# Patient Record
Sex: FEMALE | Race: WHITE | NOT HISPANIC OR LATINO | Employment: UNEMPLOYED | URBAN - METROPOLITAN AREA
[De-identification: names, ages, dates, MRNs, and addresses within clinical notes are randomized per-mention and may not be internally consistent; named-entity substitution may affect disease eponyms.]

---

## 2017-01-04 ENCOUNTER — ALLSCRIPTS OFFICE VISIT (OUTPATIENT)
Dept: PERINATAL CARE | Facility: CLINIC | Age: 37
End: 2017-01-04
Payer: COMMERCIAL

## 2017-01-04 ENCOUNTER — GENERIC CONVERSION - ENCOUNTER (OUTPATIENT)
Dept: OTHER | Facility: OTHER | Age: 37
End: 2017-01-04

## 2017-01-04 PROCEDURE — 76817 TRANSVAGINAL US OBSTETRIC: CPT | Performed by: OBSTETRICS & GYNECOLOGY

## 2017-01-04 PROCEDURE — 76811 OB US DETAILED SNGL FETUS: CPT | Performed by: OBSTETRICS & GYNECOLOGY

## 2017-02-01 ENCOUNTER — GENERIC CONVERSION - ENCOUNTER (OUTPATIENT)
Dept: OTHER | Facility: OTHER | Age: 37
End: 2017-02-01

## 2017-02-01 ENCOUNTER — ALLSCRIPTS OFFICE VISIT (OUTPATIENT)
Dept: OTHER | Facility: OTHER | Age: 37
End: 2017-02-01

## 2017-02-01 DIAGNOSIS — Z34.90 ENCOUNTER FOR SUPERVISION OF NORMAL PREGNANCY: ICD-10-CM

## 2017-02-17 ENCOUNTER — TRANSCRIBE ORDERS (OUTPATIENT)
Dept: LAB | Facility: HOSPITAL | Age: 37
End: 2017-02-17

## 2017-02-17 ENCOUNTER — APPOINTMENT (OUTPATIENT)
Dept: LAB | Facility: HOSPITAL | Age: 37
End: 2017-02-17
Attending: OBSTETRICS & GYNECOLOGY
Payer: COMMERCIAL

## 2017-02-17 DIAGNOSIS — Z34.90 ENCOUNTER FOR SUPERVISION OF NORMAL PREGNANCY: ICD-10-CM

## 2017-02-17 LAB
BASOPHILS # BLD AUTO: 0.01 THOUSANDS/ΜL (ref 0–0.1)
BASOPHILS NFR BLD AUTO: 0 % (ref 0–1)
EOSINOPHIL # BLD AUTO: 0.16 THOUSAND/ΜL (ref 0–0.61)
EOSINOPHIL NFR BLD AUTO: 1 % (ref 0–6)
ERYTHROCYTE [DISTWIDTH] IN BLOOD BY AUTOMATED COUNT: 13.1 % (ref 11.6–15.1)
GLUCOSE 1H P 50 G GLC PO SERPL-MCNC: 129 MG/DL
HCT VFR BLD AUTO: 34.8 % (ref 34.8–46.1)
HGB BLD-MCNC: 11.8 G/DL (ref 11.5–15.4)
LYMPHOCYTES # BLD AUTO: 1.8 THOUSANDS/ΜL (ref 0.6–4.47)
LYMPHOCYTES NFR BLD AUTO: 14 % (ref 14–44)
MCH RBC QN AUTO: 29.8 PG (ref 26.8–34.3)
MCHC RBC AUTO-ENTMCNC: 33.9 G/DL (ref 31.4–37.4)
MCV RBC AUTO: 88 FL (ref 82–98)
MONOCYTES # BLD AUTO: 0.69 THOUSAND/ΜL (ref 0.17–1.22)
MONOCYTES NFR BLD AUTO: 5 % (ref 4–12)
NEUTROPHILS # BLD AUTO: 10.48 THOUSANDS/ΜL (ref 1.85–7.62)
NEUTS SEG NFR BLD AUTO: 80 % (ref 43–75)
NRBC BLD AUTO-RTO: 0 /100 WBCS
PLATELET # BLD AUTO: 239 THOUSANDS/UL (ref 149–390)
PMV BLD AUTO: 9.6 FL (ref 8.9–12.7)
RBC # BLD AUTO: 3.96 MILLION/UL (ref 3.81–5.12)
RPR SER QL: NORMAL
WBC # BLD AUTO: 13.24 THOUSAND/UL (ref 4.31–10.16)

## 2017-02-17 PROCEDURE — 85025 COMPLETE CBC W/AUTO DIFF WBC: CPT

## 2017-02-17 PROCEDURE — 82950 GLUCOSE TEST: CPT

## 2017-02-17 PROCEDURE — 86592 SYPHILIS TEST NON-TREP QUAL: CPT

## 2017-02-17 PROCEDURE — 36415 COLL VENOUS BLD VENIPUNCTURE: CPT

## 2017-03-02 ENCOUNTER — GENERIC CONVERSION - ENCOUNTER (OUTPATIENT)
Dept: OTHER | Facility: OTHER | Age: 37
End: 2017-03-02

## 2017-03-16 ENCOUNTER — GENERIC CONVERSION - ENCOUNTER (OUTPATIENT)
Dept: OTHER | Facility: OTHER | Age: 37
End: 2017-03-16

## 2017-03-30 ENCOUNTER — GENERIC CONVERSION - ENCOUNTER (OUTPATIENT)
Dept: OTHER | Facility: OTHER | Age: 37
End: 2017-03-30

## 2017-04-11 ENCOUNTER — ALLSCRIPTS OFFICE VISIT (OUTPATIENT)
Dept: PERINATAL CARE | Facility: CLINIC | Age: 37
End: 2017-04-11
Payer: COMMERCIAL

## 2017-04-11 ENCOUNTER — GENERIC CONVERSION - ENCOUNTER (OUTPATIENT)
Dept: OTHER | Facility: OTHER | Age: 37
End: 2017-04-11

## 2017-04-11 PROCEDURE — 76816 OB US FOLLOW-UP PER FETUS: CPT | Performed by: OBSTETRICS & GYNECOLOGY

## 2017-04-13 ENCOUNTER — GENERIC CONVERSION - ENCOUNTER (OUTPATIENT)
Dept: OTHER | Facility: OTHER | Age: 37
End: 2017-04-13

## 2017-04-27 ENCOUNTER — ALLSCRIPTS OFFICE VISIT (OUTPATIENT)
Dept: OTHER | Facility: OTHER | Age: 37
End: 2017-04-27

## 2017-04-29 LAB — STREP GRP B, MOLECULAR (HISTORICAL): NEGATIVE

## 2017-04-30 ENCOUNTER — APPOINTMENT (EMERGENCY)
Dept: RADIOLOGY | Facility: HOSPITAL | Age: 37
End: 2017-04-30
Payer: COMMERCIAL

## 2017-04-30 ENCOUNTER — HOSPITAL ENCOUNTER (OUTPATIENT)
Facility: HOSPITAL | Age: 37
Discharge: HOME | End: 2017-04-30
Attending: OBSTETRICS & GYNECOLOGY | Admitting: OBSTETRICS & GYNECOLOGY
Payer: COMMERCIAL

## 2017-04-30 ENCOUNTER — HOSPITAL ENCOUNTER (EMERGENCY)
Facility: HOSPITAL | Age: 37
Discharge: HOME | End: 2017-04-30
Attending: EMERGENCY MEDICINE | Admitting: EMERGENCY MEDICINE
Payer: COMMERCIAL

## 2017-04-30 VITALS
TEMPERATURE: 98.6 F | SYSTOLIC BLOOD PRESSURE: 110 MMHG | RESPIRATION RATE: 20 BRPM | WEIGHT: 190 LBS | OXYGEN SATURATION: 97 % | HEART RATE: 86 BPM | DIASTOLIC BLOOD PRESSURE: 66 MMHG

## 2017-04-30 VITALS
RESPIRATION RATE: 20 BRPM | HEART RATE: 96 BPM | DIASTOLIC BLOOD PRESSURE: 81 MMHG | TEMPERATURE: 98.5 F | WEIGHT: 192 LBS | BODY MASS INDEX: 34.02 KG/M2 | SYSTOLIC BLOOD PRESSURE: 119 MMHG | HEIGHT: 63 IN

## 2017-04-30 DIAGNOSIS — R05.9 COUGH: Primary | ICD-10-CM

## 2017-04-30 PROCEDURE — 99283 EMERGENCY DEPT VISIT LOW MDM: CPT

## 2017-04-30 PROCEDURE — 99214 OFFICE O/P EST MOD 30 MIN: CPT

## 2017-04-30 PROCEDURE — 71020 HB CHEST X-RAY 2VW FRONTAL&LATL: CPT

## 2017-04-30 NOTE — PROGRESS NOTES
L&D Triage Note - OB/GYN  Nira Skiff 30 y o  female MRN: [id number]  Unit/Bed#:  Triage 2 Encounter: [id number]      SUBJECTIVE:  Nira Skiff 30 y o  [id number] at 37w0d complaining of rib pain over the past 2 days  Has a hx of seasonal allergies likely compounded with viral URI  Denies CP, SOB, dyspnea on exertion, palpitations, LOC, dizziness, or fatigue  Rib pain is sharp, B/L at lateral chest and superficial - parasternal, 7/10, and exacerbation by sudden deep inspiration and cough  Reproducible with palpation  Her current obstetrical history is unremarkable  Contractions: None  Leakage of fluid: None  Vaginal Bleeding: None  Fetal movement: present  OBJECTIVE:  Vitals:    17 1537   BP: 119/81   Pulse: 96   Resp: 20   Temp: 98 5 °F (36 9 °C)         FHT:  135 bpm baseline / Moderate 6 - 25 bpm / reactive, reassuring tracing  [place]: none      ASSESSMENT:  Nira Skiff 30 y o   at 37w0d with seasonal allergies and likely compound viral URI complaining of sharp pain with cough - costochondritis  PLAN:  1  Continue routine prenatal care, recommended tylenol, Delsom, Robitussin DM, claritin, or benadryl  2  Discharge from [facility] triage with labor precautions  3  Case discussed with Dr KELLEY Taylor MD  OB/GYN PGY-1  2017 4:09 PM]

## 2017-04-30 NOTE — IP AVS SNAPSHOT
[address] [facility]  [address] [facility], [address]  [phone]                  After Visit Summary for:             Mika Agarwal   37 yrs Female (1980)    MRN:  [id number]           About your hospitalization     You were discharged on:  April 30, 2017 You last received care in the:  [address] [place] [address] and Delivery     Unit phone number:  [phone]         Medications     It is important that you maintain an up-to-date list of medications (prescribed and over-the-counter, as well as vitamins and mineral supplements) that you are taking  Bring your list of current medications whenever you seek treatment at a hospital or other healthcare setting  If you have any questions or concerns, contact your primary care physician's office  Your Medications      TAKE these medications     PRENATAL VITAMIN PO   Take 1 tablet by mouth daily   Refills:  0                      Your Medications      Your Medication List           Morning Noon Evening Bedtime As Needed    PRENATAL VITAMIN PO   Take 1 tablet by mouth daily                                            Follow-ups for After Discharge        Immunizations     Name Date      DTaP 03/02/17     Influenza, Quadrivalent 11/10/16       Your Allergies  Date Reviewed: 4/30/2017    Allergen Reactions    Clarithromycin GI Intolerance    Biaxin          Instructions for After Discharge           Summary of Your Hospitalization        Reason for Hospitalization     Your primary diagnosis was:  Not on File      Chief Complaint     Rib Pain      Attending/Consulting Providers     Provider Service Role Specialty Primary office phone    Carrie Johnson MD -- Attending Provider Obstetrics and Gynecology [phone]         [facility]     To access this document and other health information online, please visit www  [employer] to login or create a patient portal account     For questions about any pending test results, you may contact the ordering physician or your primary care provider  Educational Information     Notify Physician for any of the following:    · Regular contractions - More than 6 per hour every 5 minutes    · Leakage of fluid (water breaks)  Observe time and color of fluid  · Vaginal bleeding  · Elevated temperature greater than 100 4°F     · Symptoms of frequent urination, blood in the urine and/or burning upon urination  · Decrease in fetal movement in last 8 hours, Fetal kick count less than 10 movements in 2 hours    · Any other questions and/or problems

## 2017-05-04 ENCOUNTER — GENERIC CONVERSION - ENCOUNTER (OUTPATIENT)
Dept: OTHER | Facility: OTHER | Age: 37
End: 2017-05-04

## 2017-05-08 ENCOUNTER — GENERIC CONVERSION - ENCOUNTER (OUTPATIENT)
Dept: OTHER | Facility: OTHER | Age: 37
End: 2017-05-08

## 2017-05-11 ENCOUNTER — GENERIC CONVERSION - ENCOUNTER (OUTPATIENT)
Dept: OTHER | Facility: OTHER | Age: 37
End: 2017-05-11

## 2017-05-17 ENCOUNTER — GENERIC CONVERSION - ENCOUNTER (OUTPATIENT)
Dept: OTHER | Facility: OTHER | Age: 37
End: 2017-05-17

## 2017-05-17 ENCOUNTER — ALLSCRIPTS OFFICE VISIT (OUTPATIENT)
Dept: OTHER | Facility: OTHER | Age: 37
End: 2017-05-17

## 2017-05-22 ENCOUNTER — GENERIC CONVERSION - ENCOUNTER (OUTPATIENT)
Dept: OTHER | Facility: OTHER | Age: 37
End: 2017-05-22

## 2017-05-22 LAB — EXTERNAL GROUP B STREP ANTIGEN: NEGATIVE

## 2017-05-24 ENCOUNTER — ANESTHESIA EVENT (INPATIENT)
Dept: LABOR AND DELIVERY | Facility: HOSPITAL | Age: 37
End: 2017-05-24
Payer: COMMERCIAL

## 2017-05-24 ENCOUNTER — ANESTHESIA (INPATIENT)
Dept: LABOR AND DELIVERY | Facility: HOSPITAL | Age: 37
End: 2017-05-24
Payer: COMMERCIAL

## 2017-05-24 ENCOUNTER — HOSPITAL ENCOUNTER (INPATIENT)
Facility: HOSPITAL | Age: 37
LOS: 2 days | Discharge: HOME | End: 2017-05-26
Attending: OBSTETRICS & GYNECOLOGY | Admitting: OBSTETRICS & GYNECOLOGY
Payer: COMMERCIAL

## 2017-05-24 DIAGNOSIS — O47.9 UTERINE CONTRACTIONS DURING PREGNANCY: ICD-10-CM

## 2017-05-24 DIAGNOSIS — Z3A.40 40 WEEKS GESTATION OF PREGNANCY: ICD-10-CM

## 2017-05-24 LAB
ABO GROUP BLD: NORMAL
BASE EXCESS BLDCOA CALC-SCNC: -4.6 MMOL/L (ref 3–11)
BASE EXCESS BLDCOV CALC-SCNC: -4.7 MMOL/L (ref 1–9)
BASOPHILS # BLD AUTO: 0.02 THOUSANDS/ΜL (ref 0–0.1)
BASOPHILS NFR BLD AUTO: 0 % (ref 0–1)
BLD GP AB SCN SERPL QL: NEGATIVE
EOSINOPHIL # BLD AUTO: 0.36 THOUSAND/ΜL (ref 0–0.61)
EOSINOPHIL NFR BLD AUTO: 2 % (ref 0–6)
ERYTHROCYTE [DISTWIDTH] IN BLOOD BY AUTOMATED COUNT: 14.6 % (ref 11.6–15.1)
HCO3 BLDCOA-SCNC: 24.6 MMOL/L (ref 17.3–27.3)
HCO3 BLDCOV-SCNC: 23 MMOL/L (ref 12.2–28.6)
HCT VFR BLD AUTO: 35.2 % (ref 34.8–46.1)
HGB BLD-MCNC: 11.4 G/DL (ref 11.5–15.4)
LYMPHOCYTES # BLD AUTO: 2.32 THOUSANDS/ΜL (ref 0.6–4.47)
LYMPHOCYTES NFR BLD AUTO: 15 % (ref 14–44)
MCH RBC QN AUTO: 27.3 PG (ref 26.8–34.3)
MCHC RBC AUTO-ENTMCNC: 32.4 G/DL (ref 31.4–37.4)
MCV RBC AUTO: 84 FL (ref 82–98)
MONOCYTES # BLD AUTO: 1.1 THOUSAND/ΜL (ref 0.17–1.22)
MONOCYTES NFR BLD AUTO: 7 % (ref 4–12)
NEUTROPHILS # BLD AUTO: 11.93 THOUSANDS/ΜL (ref 1.85–7.62)
NEUTS SEG NFR BLD AUTO: 76 % (ref 43–75)
NRBC BLD AUTO-RTO: 0 /100 WBCS
O2 CT VFR BLDCOA CALC: 4.2 ML/DL
OXYHGB MFR BLDCOA: 16.5 %
OXYHGB MFR BLDCOV: 33.3 %
PCO2 BLDCOA: 61.3 MM[HG] (ref 30–60)
PCO2 BLDCOV: 51.7 MM HG (ref 27–43)
PH BLDCOA: 7.22 [PH] (ref 7.23–7.43)
PH BLDCOV: 7.27 [PH] (ref 7.19–7.49)
PLATELET # BLD AUTO: 245 THOUSANDS/UL (ref 149–390)
PMV BLD AUTO: 10.1 FL (ref 8.9–12.7)
PO2 BLDCOA: 12.5 MM HG (ref 5–25)
PO2 BLDCOV: 18.8 MM HG (ref 15–45)
RBC # BLD AUTO: 4.17 MILLION/UL (ref 3.81–5.12)
RH BLD: NEGATIVE
SAO2 % BLDCOV: 8.5 ML/DL
SPECIMEN EXPIRATION DATE: NORMAL
WBC # BLD AUTO: 15.83 THOUSAND/UL (ref 4.31–10.16)

## 2017-05-24 PROCEDURE — 85025 COMPLETE CBC W/AUTO DIFF WBC: CPT | Performed by: OBSTETRICS & GYNECOLOGY

## 2017-05-24 PROCEDURE — A9270 NON-COVERED ITEM OR SERVICE: HCPCS | Performed by: OBSTETRICS & GYNECOLOGY

## 2017-05-24 PROCEDURE — 82805 BLOOD GASES W/O2 SATURATION: CPT | Performed by: OBSTETRICS & GYNECOLOGY

## 2017-05-24 PROCEDURE — G0463 HOSPITAL OUTPT CLINIC VISIT: HCPCS

## 2017-05-24 PROCEDURE — 86592 SYPHILIS TEST NON-TREP QUAL: CPT | Performed by: OBSTETRICS & GYNECOLOGY

## 2017-05-24 PROCEDURE — 86850 RBC ANTIBODY SCREEN: CPT | Performed by: OBSTETRICS & GYNECOLOGY

## 2017-05-24 PROCEDURE — 88307 TISSUE EXAM BY PATHOLOGIST: CPT | Performed by: OBSTETRICS & GYNECOLOGY

## 2017-05-24 PROCEDURE — 86900 BLOOD TYPING SEROLOGIC ABO: CPT | Performed by: OBSTETRICS & GYNECOLOGY

## 2017-05-24 PROCEDURE — 99214 OFFICE O/P EST MOD 30 MIN: CPT

## 2017-05-24 PROCEDURE — 86901 BLOOD TYPING SEROLOGIC RH(D): CPT | Performed by: OBSTETRICS & GYNECOLOGY

## 2017-05-24 PROCEDURE — 0KQM0ZZ REPAIR PERINEUM MUSCLE, OPEN APPROACH: ICD-10-PCS | Performed by: OBSTETRICS & GYNECOLOGY

## 2017-05-24 RX ORDER — SODIUM CHLORIDE, SODIUM LACTATE, POTASSIUM CHLORIDE, CALCIUM CHLORIDE 600; 310; 30; 20 MG/100ML; MG/100ML; MG/100ML; MG/100ML
125 INJECTION, SOLUTION INTRAVENOUS CONTINUOUS
Status: DISCONTINUED | OUTPATIENT
Start: 2017-05-24 | End: 2017-05-26 | Stop reason: HOSPADM

## 2017-05-24 RX ORDER — DOCUSATE SODIUM 100 MG/1
100 CAPSULE, LIQUID FILLED ORAL 2 TIMES DAILY PRN
Status: DISCONTINUED | OUTPATIENT
Start: 2017-05-24 | End: 2017-05-26 | Stop reason: HOSPADM

## 2017-05-24 RX ORDER — OXYTOCIN/RINGER'S LACTATE 30/500 ML
PLASTIC BAG, INJECTION (ML) INTRAVENOUS
Status: COMPLETED
Start: 2017-05-24 | End: 2017-05-24

## 2017-05-24 RX ORDER — OXYTOCIN/RINGER'S LACTATE 30/500 ML
250 PLASTIC BAG, INJECTION (ML) INTRAVENOUS
Status: DISCONTINUED | OUTPATIENT
Start: 2017-05-24 | End: 2017-05-26 | Stop reason: HOSPADM

## 2017-05-24 RX ORDER — ACETAMINOPHEN 325 MG/1
650 TABLET ORAL EVERY 6 HOURS PRN
Status: DISCONTINUED | OUTPATIENT
Start: 2017-05-24 | End: 2017-05-26 | Stop reason: HOSPADM

## 2017-05-24 RX ORDER — IBUPROFEN 600 MG/1
600 TABLET ORAL EVERY 6 HOURS PRN
Status: DISCONTINUED | OUTPATIENT
Start: 2017-05-24 | End: 2017-05-26 | Stop reason: HOSPADM

## 2017-05-24 RX ORDER — ONDANSETRON 2 MG/ML
4 INJECTION INTRAMUSCULAR; INTRAVENOUS EVERY 6 HOURS PRN
Status: DISCONTINUED | OUTPATIENT
Start: 2017-05-24 | End: 2017-05-25

## 2017-05-24 RX ORDER — DIAPER,BRIEF,INFANT-TODD,DISP
1 EACH MISCELLANEOUS AS NEEDED
Status: DISCONTINUED | OUTPATIENT
Start: 2017-05-24 | End: 2017-05-26 | Stop reason: HOSPADM

## 2017-05-24 RX ADMIN — WITCH HAZEL 1 PAD: 500 SOLUTION RECTAL; TOPICAL at 09:27

## 2017-05-24 RX ADMIN — SODIUM CHLORIDE, SODIUM LACTATE, POTASSIUM CHLORIDE, AND CALCIUM CHLORIDE 125 ML/HR: .6; .31; .03; .02 INJECTION, SOLUTION INTRAVENOUS at 06:36

## 2017-05-24 RX ADMIN — SODIUM CHLORIDE, SODIUM LACTATE, POTASSIUM CHLORIDE, AND CALCIUM CHLORIDE 999 ML/HR: .6; .31; .03; .02 INJECTION, SOLUTION INTRAVENOUS at 05:30

## 2017-05-24 RX ADMIN — BENZOCAINE AND MENTHOL: 20; .5 SPRAY TOPICAL at 09:26

## 2017-05-24 RX ADMIN — HYDROCORTISONE 1 APPLICATION: 10 CREAM TOPICAL at 09:26

## 2017-05-24 RX ADMIN — DOCUSATE SODIUM 100 MG: 100 CAPSULE, LIQUID FILLED ORAL at 09:27

## 2017-05-24 RX ADMIN — Medication 250 UNITS: at 07:22

## 2017-05-24 RX ADMIN — Medication 250 ML: at 06:00

## 2017-05-25 LAB
ABO GROUP BLD: NORMAL
BLD GP AB SCN SERPL QL: NEGATIVE
FETAL CELL SCN BLD QL ROSETTE: NEGATIVE
RH BLD: NEGATIVE
RPR SER QL: NORMAL

## 2017-05-25 PROCEDURE — 85461 HEMOGLOBIN FETAL: CPT | Performed by: OBSTETRICS & GYNECOLOGY

## 2017-05-25 PROCEDURE — 86900 BLOOD TYPING SEROLOGIC ABO: CPT | Performed by: OBSTETRICS & GYNECOLOGY

## 2017-05-25 PROCEDURE — 86850 RBC ANTIBODY SCREEN: CPT | Performed by: OBSTETRICS & GYNECOLOGY

## 2017-05-25 PROCEDURE — 86901 BLOOD TYPING SEROLOGIC RH(D): CPT | Performed by: OBSTETRICS & GYNECOLOGY

## 2017-05-25 PROCEDURE — A9270 NON-COVERED ITEM OR SERVICE: HCPCS | Performed by: OBSTETRICS & GYNECOLOGY

## 2017-05-25 RX ADMIN — Medication 1 TABLET: at 09:33

## 2017-05-25 RX ADMIN — HUMAN RHO(D) IMMUNE GLOBULIN 300 MCG: 300 INJECTION, SOLUTION INTRAMUSCULAR at 15:40

## 2017-05-25 RX ADMIN — DOCUSATE SODIUM 100 MG: 100 CAPSULE, LIQUID FILLED ORAL at 09:33

## 2017-05-25 RX ADMIN — DOCUSATE SODIUM 100 MG: 100 CAPSULE, LIQUID FILLED ORAL at 18:49

## 2017-05-26 VITALS
WEIGHT: 194 LBS | HEIGHT: 63 IN | TEMPERATURE: 98.1 F | BODY MASS INDEX: 34.38 KG/M2 | HEART RATE: 81 BPM | SYSTOLIC BLOOD PRESSURE: 118 MMHG | DIASTOLIC BLOOD PRESSURE: 76 MMHG | RESPIRATION RATE: 20 BRPM | OXYGEN SATURATION: 99 %

## 2017-05-26 PROCEDURE — A9270 NON-COVERED ITEM OR SERVICE: HCPCS | Performed by: OBSTETRICS & GYNECOLOGY

## 2017-05-26 RX ORDER — DOCUSATE SODIUM 100 MG/1
100 CAPSULE, LIQUID FILLED ORAL 2 TIMES DAILY PRN
Qty: 10 CAPSULE | Refills: 0
Start: 2017-05-26 | End: 2018-06-14 | Stop reason: ALTCHOICE

## 2017-05-26 RX ORDER — IBUPROFEN 600 MG/1
600 TABLET ORAL EVERY 6 HOURS PRN
Qty: 30 TABLET | Refills: 0
Start: 2017-05-26 | End: 2018-06-14 | Stop reason: ALTCHOICE

## 2017-05-26 RX ADMIN — DOCUSATE SODIUM 100 MG: 100 CAPSULE, LIQUID FILLED ORAL at 07:37

## 2017-06-03 ENCOUNTER — TELEPHONE (OUTPATIENT)
Dept: LABOR AND DELIVERY | Facility: HOSPITAL | Age: 37
End: 2017-06-03

## 2017-06-05 ENCOUNTER — TELEPHONE (OUTPATIENT)
Dept: LABOR AND DELIVERY | Facility: HOSPITAL | Age: 37
End: 2017-06-05

## 2017-07-06 ENCOUNTER — GENERIC CONVERSION - ENCOUNTER (OUTPATIENT)
Dept: OTHER | Facility: OTHER | Age: 37
End: 2017-07-06

## 2017-07-24 ENCOUNTER — GENERIC CONVERSION - ENCOUNTER (OUTPATIENT)
Dept: OTHER | Facility: OTHER | Age: 37
End: 2017-07-24

## 2017-07-26 ENCOUNTER — HOSPITAL ENCOUNTER (OUTPATIENT)
Dept: RADIOLOGY | Facility: HOSPITAL | Age: 37
Discharge: HOME | End: 2017-07-26
Attending: OBSTETRICS & GYNECOLOGY
Payer: COMMERCIAL

## 2017-07-26 PROCEDURE — 76830 TRANSVAGINAL US NON-OB: CPT

## 2017-07-26 PROCEDURE — 76856 US EXAM PELVIC COMPLETE: CPT

## 2017-08-08 ENCOUNTER — GENERIC CONVERSION - ENCOUNTER (OUTPATIENT)
Dept: OTHER | Facility: OTHER | Age: 37
End: 2017-08-08

## 2017-08-24 ENCOUNTER — GENERIC CONVERSION - ENCOUNTER (OUTPATIENT)
Dept: OTHER | Facility: OTHER | Age: 37
End: 2017-08-24

## 2018-01-09 NOTE — MISCELLANEOUS
Message   Recorded as Task   Date: 10/05/2016 12:15 PM, Created By: Romi Weeks   Task Name: Call Back   Assigned To: Sar[facility]ccoy OB,Team   Regarding Patient: Bob Ventura, Status: Active   Comment:    Yeni Brandt - 05 Oct 2016 12:15 PM     TASK CREATED  pt is going on a cruise in 2 wks and wants to know if there is anything that she can take for sea sickness  [phone]   Jazzmine Benavides - 05 Oct 2016 12:51 PM     TASK REPLIED TO: Previously Assigned To [facility],Team    spoke w/ pt will d/w pt @ obv on 10/6/16        Active Problems    1  Acute sinusitis (461 9) (J01 90)   2  Allergic rhinitis (477 9) (J30 9)   3  Encounter for fetal anatomic survey (V28 81) (Z36)   4  Encounter for gynecological examination without abnormal finding (V72 31) (Z01 419)   5  Encounter for Rh blood typing (V72 86) (Z01 83)   6  Need for vaccination for DTP (V06 1) (Z23)   7  Oral contraceptive prescribed (V25 01) (Z30 011)   8  Postpartum depression (616 94,961) (F53)   9  Pregnancy (V22 2) (Z33 1)   10  Screening, , for risk of pre-term labor (V28 82) (Z36)   11  Umbilical hernia (031 5) (K42 9)   12  Vaginitis due to Candida albicans (112 1) (B37 3)    Current Meds   1  No Reported Medications Recorded    Allergies    1  Biaxin TABS    2  Animal dander - Dogs   3  Grass   4  Pollen   5   Ragweed    Signatures   Electronically signed by : Elsy Rinaldi, ; Oct  5 2016 12:52PM EST                       (Author)

## 2018-01-11 NOTE — MISCELLANEOUS
Message   Recorded as Task   Date: 08/22/2017 05:56 PM, Created By: Zenia Handley   Task Name: Care Coordination   Assigned To: Melvi Ramirez   Regarding Patient: Gavin Pugh, Status: In Progress   Comment:    Gilbert Stuartth - 22 Aug 2017 5:56 PM     TASK CREATED  Please call patient to ask if bleeding has resolved  Let me know as that was going to decide whether she needed a D+C  Thanks!!   Eileen Aguilar - 22 Aug 2017 6:01 PM     TASK IN PROGRESS   Eileen Aguilar - 23 Aug 2017 9:01 AM     TASK EDITED  Will call this patient tomorrow  Eileen Aguilar - 24 Aug 2017 9:44 AM     TASK EDITED  [facility] asking patient to call back with update with bleeding  Eileen Aguilar - 24 Aug 2017 4:10 PM     TASK EDITED  Patient called back  Bleeding has resolved  She will call if it resumes  Active Problems    1  Blood type, Rh negative (V49 89) (Z67 91)   2  Elderly multigravida in third trimester (659 63) (O09 523)   3  Encounter for supervision of other normal pregnancy in third trimester (V22 1) (Z34 83)   4  Postpartum bleeding (666 10) (O72 1)   5  Retained products of conception, postpartum (667 02) (O73 0)   6  Umbilical hernia (338 2) (K42 9)   7  Ventral hernia (553 20) (K43 9)    Current Meds   1  Breast Pump Miscellaneous; Therapy: [id number] to Recorded   2  Folic Acid TABS; Therapy: (Recorded:13Oct2016) to Recorded   3  Prenatal Vitamin TABS; TAKE TABLET  per pt taking cyrofood , all natural prenatal;   Therapy: (Recorded:14Nov2016) to Recorded    Allergies    1  Biaxin TABS    2  Animal dander - Dogs   3  Grass   4  Pollen   5  Ragweed   6  Seasonal    Signatures   Electronically signed by :  Ella Neal, ; Aug 24 2017  4:10PM EST                       (Author)

## 2018-01-12 NOTE — PROGRESS NOTES
2016         RE: Ana Harley                              To: [facility]  MR#: [id number]                                    [address]   : 1980                                   [address]   ENC: [id number]:[address]   (Exam #: [id number])                           Fax: [phone]      The LMP of this 31year old,  G3, P2-0-0-2 patient was AUG 2 2016, her   working HIRAL is MAY 21 2017 and the current gestational age is 17 weeks 1   day by [address] [place] [address]  A sonographic examination was performed on 2016 using real time equipment  The ultrasound examination was   performed using abdominal technique  The patient has a BMI of 29 0  Her   blood pressure today was 118/62  No text Earliest ultrasound found in her record: 10/13/16  8w4d  17   HIRAL Multiple longitudinal and transverse sections revealed a mayo   intrauterine pregnancy  The placenta is anterior in implantation  Cardiac motion was observed at 163 bpm       INDICATIONS      first trimester genetic screening   advanced maternal age      Exam Types      sequential screen      RESULTS      Fetus # 1 of 1   Fetal growth appeared normal      MEASUREMENTS (* Included In Average GA)      CRL              7 4 cm        13 weeks 2 days*   Nuchal Trans    1 80 mm      THE AVERAGE GESTATIONAL AGE is 13 weeks 2 days +/- 7 days  ANATOMY COMMENTS      Anatomic detail is limited at this gestational age  The yolk sac was not   noted  The fetal cranium appeared normal in shape and the nuchal   translucency was normal in size (1 8mm)  The nasal bone appears to be   present  The intracranial anatomy was unremarkable  Evaluation of the   spine revealed no obvious evidence for a neural tube defect  Anatomy of   the fetal thorax appeared within normal limits  The cardiac rhythm was   regular    Within the abdomen, stomach & bladder were visualized and the   abdominal wall appeared intact  A three vessel cord appears to be present  Active movement of the fetal body & extremities was seen  There is no   suspicion of a subchorionic bleed  The placental cord insertion was   normal    There is no suspicion of a uterine myoma  Free fluid is not seen   in the posterior cul-de-sac  ADNEXA      The left ovary appeared normal and measured 3 2 x 2 7 x 1 3 cm with a   volume of 5 9 cc  The right ovary appeared normal and measured 2 9 x 3 0 x   1 5 cm with a volume of 6 8 cc  AMNIOTIC FLUID         Largest Vertical Pocket = 2 7 cm   Amniotic Fluid: Normal      IMPRESSION      [facility] IUP   13 weeks and 2 days by this ultrasound  (HIRAL=MAY 20 2017)   13 weeks and 1 day by 1st Tri Sono  (HIRAL=MAY 21 2017)   Fetal growth appeared normal   Regular fetal heart rate of 163 bpm   Anterior placenta      Lizeth Taylor      Dear Dr Filomena Taylor,      Thank you very much for requesting a consultation on this very nice   patient for the indication of advanced maternal age  This is the   patient's third pregnancy  She has a history of 2 previous full-term   vaginal deliveries without complications  She has seasonal allergies   causing asthma but does not require the use of medications on a regular   basis  She has no significant surgical history  She denies the current   use of tobacco, alcohol, or drugs  Her medications include prenatal   vitamins, folic acid, and DHA  She has no significant drug allergies  Her family medical history is noncontributory  A review of systems is   otherwise negative  On exam, the patient appears well, in no acute   distress, and her abdomen is nontender        We discussed the options for genetic screening, including but not limited   to first trimester screening, second trimester screening, combined first   and second trimester screening, noninvasive prenatal testing (NIPT) for   patients at high risk and diagnostic screening through the use of CVS and   amniocentesis  We discussed the risks and benefits of each approach   including the sensitivities and false positive rates as well as the   difference between a screening test and a diagnostic test   At the   conclusion of our discussion the patient elected noninvasive prenatal   testing utilizing the MaterniT 21 plus test   The patient had her blood   drawn in our office and the results should be available in approximately   7-10 days  Recommend an anatomy ultrasound be scheduled for 20 weeks gestation  Please note, in addition to the time spent discussing the results of the   ultrasound, I spent approximately 15 minutes of face-to-face time with the   patient, greater than 50% of which was spent in counseling and the   coordination of care for this patient  Thank you very much for allowing us to participate in the care of this   very nice patient  Should you have any questions, please do not hesitate   to contact our office  EPI De Los Santos M D     Electronically signed 11/14/16 17:00

## 2018-01-13 VITALS
HEIGHT: 63 IN | SYSTOLIC BLOOD PRESSURE: 122 MMHG | BODY MASS INDEX: 33.77 KG/M2 | DIASTOLIC BLOOD PRESSURE: 64 MMHG | WEIGHT: 190.6 LBS

## 2018-01-13 NOTE — PROGRESS NOTES
2017         RE: Chris Alvarado                              To: [facility]  MR#: [id number]                                    [address]   : 1980                                   [address]   ENC: [id number]:[facility], [address]   (Exam #: [id number])                           Fax: [phone]      The LMP of this 31year old,  G3, P2-0-0-2 patient was AUG 2 2016, her   working HIRAL is MAY 21 2017 and the current gestational age is 25 weeks 3   days by [address] [place] [address]  A sonographic examination was performed on 2017 using real time equipment  The ultrasound examination was performed   using abdominal technique  The patient has a BMI of 31 0  Her blood   pressure today was 117/60  No text Earliest ultrasound found in her record: 10/13/16  8w4d  17   HIRAL      Wale Gonsalez has no complaints  She denies vaginal bleeding and has not yet   begun to perceive fetal movement  Non invasive prenatal testing earlier in   the pregnancy using cell free DNA analysis revealed negative results  A   recent MSAFP value was normal at 0 84 MoM  Cardiac motion was observed        INDICATIONS      advanced maternal age      Exam Types      Transvaginal   LEVEL II      RESULTS      Fetus # 1 of 1   Breech presentation   Fetal growth appeared normal   Placenta Location = Anterior   No placenta previa   Placenta Grade = I      MEASUREMENTS (* Included In Average GA)      AC              14 4 cm        19 weeks 4 days* (33%)   BPD              4 5 cm        19 weeks 4 days* (27%)   HC              16 9 cm        19 weeks 4 days* (22%)   Femur            3 1 cm        19 weeks 6 days* (31%)      Nuchal Fold      5 2 mm      Humerus          3 1 cm        20 weeks 1 day   (46%)   Foot             3 4 cm        20 weeks 4 days      Cerebellum       2 2 cm        21 weeks 5 days   Biorbit          3 1 cm        19 weeks 5 days   CisternaMagna 5 4 mm      HC/AC           1 17   FL/AC           0 22   FL/BPD          0 70   EFW (Ac/Fl/Hc)   307 grams - 0 lbs 11 oz      THE AVERAGE GESTATIONAL AGE is 19 weeks 4 days +/- 10 days  AMNIOTIC FLUID         Largest Vertical Pocket = 3 7 cm   Amniotic Fluid: Normal      CERVICAL EVALUATION      The cervix appeared normal (Ultrasound Examination)  SUPINE      Cervical Length: 4 60 cm      OTHER TEST RESULTS           Funneling?: No             Dynamic Changes?: No        Resp  To TFP?: No                      Debris?: No      ANATOMY      Head                                    Normal   Face/Neck                               Normal   Th  Cav  Normal   Heart                                   Normal   Abd  Cav  Normal   Stomach                                 Normal   Right Kidney                            Normal   Left Kidney                             Normal   Bladder                                 Normal   Abd  Wall                               Normal   Spine                                   Normal   Extrems                                 Normal   Genitalia                               Normal   Placenta                                Normal   Umbl  Cord                              Normal   Uterus                                  Normal   PCI                                     Not Visualized      ANATOMY DETAILS      Visualized Appearing Sonographically Normal:   HEAD: (Calvarium, BPD Level, Cavum, Lateral Ventricles, Choroid Plexus,   Cerebellum, Cisterna Magna);    FACE/NECK: (Neck, Nuchal Fold, Profile,   Orbits, Nose/Lips, Palate, Face);    TH  CAV  : (Lungs, Diaphragm);       HEART: (Four Chamber View, Proximal Left Outflow, Proximal Right Outflow,   3VV, 3 Vessel Trachea, Short Axis of Greater Vessels, Ductal Arch, Aortic   Arch, Interventricular Septum, Interatrial Septum, IVC, SVC, Cardiac Axis,   Cardiac Position);    ABD  CAV : (Liver);    STOMACH, RIGHT KIDNEY, LEFT   KIDNEY, BLADDER, ABD  WALL, SPINE: (Cervical Spine, Thoracic Spine, Lumbar   Spine, Sacrum);    EXTREMS: (Lt Humerus, Rt Humerus, Lt Forearm, Rt   Forearm, Lt Hand, Rt Hand, Lt Femur, Rt Femur, Lt Low Leg, Rt Low Leg, Lt   Foot, Rt Foot);    GENITALIA (Female), PLACENTA, UMBL  CORD, UTERUS      Not Visualized:   PCI      ADNEXA      The left ovary appeared normal and measured 2 7 x 2 6 x 1 6 cm with a   volume of 5 9 cc  The right ovary appeared normal and measured 2 5 x 1 3 x   1 2 cm with a volume of 2 0 cc  IMPRESSION      [facility] IUP   19 weeks and 4 days by this ultrasound  (HIRAL=MAY 27 2017)   20 weeks and 3 days by 1st Tri Sono  (HIRAL=MAY 21 2017)   Breech presentation   Fetal growth appeared normal   Heart movement seen   Anterior placenta   No placenta previa      GENERAL COMMENT      No fetal structural abnormality or ultrasound marker for aneuploidy is   identified on the Level II ultrasound study today  The placental cord   insertion site is suboptimally imaged secondary to unfavorable fetal   position  Fetal growth and amniotic fluid volume are normal   The   placenta is normal in appearance  The cervix is normal in appearance by transvaginal sonography  The   cervical length is normal   Cervical debris is not present  Cervical   funneling is not present  Neither provocative nor dynamic change is   appreciated  Today's ultrasound findings and suggested follow-up were discussed in   detail with Maria Alejandra Sinclair  We discussed that prenatal ultrasound cannot rule out   all congenital abnormalities  Her non invasive prenatal testing results   were discussed  Maria Alejandra Sinclair will return to the [facility]  at 32 weeks   gestation to assess interval growth for the indication of advanced   maternal age        The face to face time, in addition to time spent discussing ultrasound   results, was approximately 10 minutes, greater than 50% of which was spent   during counseling and coordination of care  [facility], ANSON Reilly     Maternal-Fetal Medicine   Electronically signed 01/07/17 09:29

## 2018-01-14 VITALS
BODY MASS INDEX: 34.38 KG/M2 | DIASTOLIC BLOOD PRESSURE: 66 MMHG | WEIGHT: 194 LBS | HEIGHT: 63 IN | SYSTOLIC BLOOD PRESSURE: 110 MMHG

## 2018-01-16 NOTE — PROGRESS NOTES
2017         RE: Junior Shirley                              To: Rachel Junior, M D    MR#: [id number]   : 1980   ENC: [id number]                             Fax: [phone]   (Exam #: [id number])      The LMP of this 30year old,  G3, P2-0-0-2 patient was AUG 2 2016, her   working HIRAL is MAY 21 2017 and the current gestational age is 29 weeks 2   days by 1st Trimester Sono  A sonographic examination was performed on 2017 using real time equipment  The ultrasound examination was   performed using abdominal technique  The patient has a BMI of 33 7  Her   blood pressure today was 122/64  No text Earliest ultrasound found in her record: 10/13/16  8w4d  17   HIRAL      Summer Wilkinson has no complaints today  She reports regular fetal movements and   denies problems related to hypertension, gestational diabetes,    labor, or vaginal bleeding  Cardiac motion was observed at 161 bpm       INDICATIONS      advanced maternal age      Exam Types      Level I      RESULTS      Fetus # 1 of 1   Vertex presentation   Fetal growth appeared normal   Placenta Location = Anterior   No placenta previa   Placenta Grade = II      MEASUREMENTS (* Included In Average GA)      AC              31 7 cm        35 weeks 6 days* (74%)   BPD              8 1 cm        32 weeks 3 days* (12%)   HC              29 8 cm        32 weeks 5 days* (8%)   Femur            6 4 cm        32 weeks 6 days* (29%)      HC/AC           0 94   FL/AC           0 20   FL/BPD          0 79   Ceph Index      0 76   EFW (Ac/Fl/Hc)  2412 grams - 5 lbs 5 oz                 (44%)      THE AVERAGE GESTATIONAL AGE is 33 weeks 3 days +/- 18 days        AMNIOTIC FLUID      Q1: 5 6      Q2: 4 6      Q3: 3 7      Q4: 2 7   AMOS Total = 16 5 cm   Amniotic Fluid: Normal      ANATOMY DETAILS      Visualized Appearing Sonographically Normal:   HEAD: (Calvarium, BPD Level, Cavum, Lateral Ventricles, Cerebellum); FACE/NECK: (Nose/Lips);    TH  CAV : (Diaphragm); HEART: (Four Chamber   View, Proximal Left Outflow, Proximal Right Outflow, Cardiac Axis, Cardiac   Position);    STOMACH, RIGHT KIDNEY, LEFT KIDNEY, BLADDER, PLACENTA, PCI      ANATOMY COMMENTS      The prior fetal anatomic survey was limited with respect to evaluation of   the placental cord insertion site  This anatomic view was seen today as   sonographically normal within the inherent limitations of fetal ultrasound  IMPRESSION      [facility] IUP   33 weeks and 3 days by this ultrasound  (HIRAL=MAY 27 2017)   34 weeks and 2 days by 1st Tri Sono  (HIRAL=MAY 21 2017)   Vertex presentation   Fetal growth appeared normal   Regular fetal heart rate of 161 bpm   Anterior placenta   No placenta previa      GENERAL COMMENT      No fetal structural abnormality is identified on the Level I survey today  Fetal interval growth and amniotic fluid volume are normal       Today's ultrasound findings and suggested follow-up were discussed in   detail with Dong Maddox  Daily third trimester fetal kick counting was   discussed at the visit today  No further appointment has been scheduled in   the [facility]  for Dong Maddox at this time  Follow-up [facility] ultrasound   evaluation is recommended as clinically indicated  The face to face time, in addition to time spent discussing ultrasound   results, was approximately 10 minutes, greater than 50% of which was spent   during counseling and coordination of care  EPI Mcguire M D     Maternal-Fetal Medicine   Electronically signed 04/11/17 10:16

## 2018-01-18 NOTE — MISCELLANEOUS
Message   Recorded as Task   Date: 07/24/2017 09:32 AM, Created By: [facility], [facility]   Task Name: Care Coordination   Assigned To: Griselda Jarod   Regarding Patient: Archana Garcia, Status: In Progress   Comment:    Shira Ayala - 24 Jul 2017 9:32 AM     TASK CREATED  Caller: Self; Care Coordination; [phone] (Home); [phone] (Work)  Patient called - she is 9 weeks PPAR - she is still bleeding  The bleeding is light - some days it is heavier - no discomfort or pain  She is worried  Please advise  Thanks! GiovannyBetzy - 24 Jul 2017 2:57 PM     TASK REPLIED TO: [facility]  Please send for pelvic ultrasound complete  May be normal but we can tell more after the ultrasound  Luis Leeroyers - 24 Jul 2017 3:33 PM     TASK IN PROGRESS   Luis Umañaers - 24 Jul 2017 3:33 PM     TASK EDITED  [email or web address]   Luis Umañaers - 24 Jul 2017 3:41 PM     TASK EDITED  Pt to have pelvic u/s   Slip to [facility]  Active Problems    1  Blood type, Rh negative (V49 89) (Z67 91)   2  Elderly multigravida in third trimester (659 63) (O09 523)   3  Encounter for supervision of other normal pregnancy in third trimester (V22 1) (Z34 83)   4  Postpartum bleeding (666 10) (O72 1)   5  Umbilical hernia (968 4) (K42 9)   6  Ventral hernia (553 20) (K43 9)    Current Meds   1  Breast Pump Miscellaneous; Therapy: [id number] to Recorded   2  Folic Acid TABS; Therapy: (Recorded:13Oct2016) to Recorded   3  Prenatal Vitamin TABS; TAKE TABLET  per pt taking cyrofood , all natural prenatal;   Therapy: (Recorded:14Nov2016) to Recorded    Allergies    1  Biaxin TABS    2  Animal dander - Dogs   3  Grass   4  Pollen   5  Ragweed   6  Seasonal    Plan  Postpartum bleeding    · * US PELVIS COMPLETE (TRANSABDOMINAL AND TRANSVAGINAL); Status:Hold For -  YouTube; Requested for:[id number];     Signatures   Electronically signed by :  Bárbara Wooten, ; Jul 24 2017 3:44PM EST                       (Author)

## 2018-01-22 VITALS
HEIGHT: 63 IN | WEIGHT: 171 LBS | DIASTOLIC BLOOD PRESSURE: 62 MMHG | SYSTOLIC BLOOD PRESSURE: 120 MMHG | BODY MASS INDEX: 30.3 KG/M2

## 2018-01-22 VITALS
HEIGHT: 63 IN | WEIGHT: 192 LBS | SYSTOLIC BLOOD PRESSURE: 112 MMHG | DIASTOLIC BLOOD PRESSURE: 70 MMHG | BODY MASS INDEX: 34.02 KG/M2

## 2018-01-22 VITALS
SYSTOLIC BLOOD PRESSURE: 122 MMHG | DIASTOLIC BLOOD PRESSURE: 74 MMHG | BODY MASS INDEX: 34.38 KG/M2 | HEIGHT: 63 IN | WEIGHT: 194 LBS

## 2018-01-22 VITALS
SYSTOLIC BLOOD PRESSURE: 112 MMHG | WEIGHT: 174 LBS | DIASTOLIC BLOOD PRESSURE: 74 MMHG | HEIGHT: 63 IN | BODY MASS INDEX: 30.83 KG/M2

## 2018-01-22 VITALS
BODY MASS INDEX: 32.96 KG/M2 | DIASTOLIC BLOOD PRESSURE: 60 MMHG | WEIGHT: 186 LBS | HEIGHT: 63 IN | SYSTOLIC BLOOD PRESSURE: 100 MMHG

## 2018-01-22 VITALS
SYSTOLIC BLOOD PRESSURE: 114 MMHG | BODY MASS INDEX: 32.07 KG/M2 | WEIGHT: 181 LBS | HEIGHT: 63 IN | DIASTOLIC BLOOD PRESSURE: 72 MMHG

## 2018-01-22 VITALS — SYSTOLIC BLOOD PRESSURE: 112 MMHG | BODY MASS INDEX: 32.42 KG/M2 | WEIGHT: 183 LBS | DIASTOLIC BLOOD PRESSURE: 64 MMHG

## 2018-01-22 VITALS
WEIGHT: 170 LBS | DIASTOLIC BLOOD PRESSURE: 74 MMHG | SYSTOLIC BLOOD PRESSURE: 120 MMHG | HEIGHT: 63 IN | BODY MASS INDEX: 30.12 KG/M2

## 2018-01-22 VITALS — DIASTOLIC BLOOD PRESSURE: 60 MMHG | SYSTOLIC BLOOD PRESSURE: 110 MMHG | BODY MASS INDEX: 33.66 KG/M2 | WEIGHT: 190 LBS

## 2018-01-22 VITALS
HEIGHT: 63 IN | SYSTOLIC BLOOD PRESSURE: 110 MMHG | BODY MASS INDEX: 33.66 KG/M2 | DIASTOLIC BLOOD PRESSURE: 70 MMHG | WEIGHT: 190 LBS

## 2018-01-22 VITALS
BODY MASS INDEX: 34.2 KG/M2 | DIASTOLIC BLOOD PRESSURE: 70 MMHG | SYSTOLIC BLOOD PRESSURE: 110 MMHG | WEIGHT: 193 LBS | HEIGHT: 63 IN

## 2018-01-22 VITALS
DIASTOLIC BLOOD PRESSURE: 66 MMHG | SYSTOLIC BLOOD PRESSURE: 108 MMHG | WEIGHT: 193.13 LBS | BODY MASS INDEX: 34.21 KG/M2

## 2018-03-07 NOTE — PROGRESS NOTES
Education  [employer] Education 2nd Trimester:   Second Trimester Education provided:   benefits of breastfeeding, importance of exclusive breastfeeding, early initiation of breastfeeding, exclusive breastfeeding for the first 6 months, Other education given: , non-pharmacologic pain relief methods for labor, rooming-in on 24 hour basis, Pregnancy Essentials Reference Guide given and 28 week packet given  Mother has not registered for prenatal class  Thought has been given to selecting a pediatrician  [facility]ren[facility] education provided [facility]: [facility] 3[facility]:    Third Trimester Education provided:   non-pharmacologic pain relief methods for labor, importance of early skin-to-skin contact, 28 week packet given and Other education given:   rooming-in on 24 hour basis Pregnancy Essentials Reference Guide given          Signatures   Electronically signed by : Radha Lobato DO; Feb 1 2017 10:27AM EST

## 2018-05-01 ENCOUNTER — TELEPHONE (OUTPATIENT)
Dept: OBGYN CLINIC | Facility: CLINIC | Age: 38
End: 2018-05-01

## 2018-05-01 NOTE — TELEPHONE ENCOUNTER
Pt of Dr Villa Aguilar like to know if the doctor would work with the plastic surgeon Dr Cornelio Connelly for her hernia surgery and having her tubes tied at the same time  The doctor had said she would be willing to work with another surgeon  She is also looking at  Dr Milo Cerna and Jaylan Levi ([facility])

## 2018-05-14 ENCOUNTER — TELEPHONE (OUTPATIENT)
Dept: OBGYN CLINIC | Facility: CLINIC | Age: 38
End: 2018-05-14

## 2018-05-14 NOTE — TELEPHONE ENCOUNTER
Pts lmp 4/2  Pt does not want to be pregnant - looking into tubal  Did hpt over 1 week ago and this AM - both neg   Pt will call if no menses in 2 weeks

## 2018-06-14 ENCOUNTER — OFFICE VISIT (OUTPATIENT)
Dept: OBGYN CLINIC | Facility: CLINIC | Age: 38
End: 2018-06-14
Payer: COMMERCIAL

## 2018-06-14 VITALS — DIASTOLIC BLOOD PRESSURE: 70 MMHG | WEIGHT: 161 LBS | SYSTOLIC BLOOD PRESSURE: 106 MMHG | BODY MASS INDEX: 28.52 KG/M2

## 2018-06-14 DIAGNOSIS — Z30.09 STERILIZATION CONSULT: Primary | ICD-10-CM

## 2018-06-14 PROBLEM — O47.9 UTERINE CONTRACTIONS DURING PREGNANCY: Status: RESOLVED | Noted: 2017-05-24 | Resolved: 2018-06-14

## 2018-06-14 PROBLEM — K43.9 VENTRAL HERNIA: Status: ACTIVE | Noted: 2017-07-06

## 2018-06-14 PROBLEM — Z3A.40 40 WEEKS GESTATION OF PREGNANCY: Status: RESOLVED | Noted: 2017-05-24 | Resolved: 2018-06-14

## 2018-06-14 PROCEDURE — 99214 OFFICE O/P EST MOD 30 MIN: CPT | Performed by: OBSTETRICS & GYNECOLOGY

## 2018-06-14 NOTE — PROGRESS NOTES
A/P: Patient is 41 y o  yo female who desires permanent sterility    - For laparoscopic bilateral tubal ligation  Consent for procedure signed  - She will seek consultation with plastic surgeon (likely Dr Juan Jose Ryan) and let us know if they are willing to do a repair of her umbilical hernia and rectus diastasis at the same time as her tubal   If so, she will call back to let us know so we can coordinate scheduling  If not, she will have her  have vasectomy  Referral for urology given today  - Surgical coordinator to preauthorize and schedule once the patient calls back  Total visit time was 30 minutes, of which >50% was spent in counseling and coordination of care regarding the above problem  S: Patient presents today to discuss scheduling procedure for permanent contraception  She is aware of all of her other contraception options including BCP, depo provera, IUD, condoms, natural family planning, abstinence and vasectomy  She is aware of the pros and cons of laparoscopic tubal ligation versus Essure  She is aware of the permanent and irreversible nature of this procedure  She wishes to proceed with laparoscopic tubal as she has had problems with systemic hormones so could not do prep for Essure  She also does not like the idea of anything extra inside her so declines IUD  Discussed the options for laparoscopic tubal and pros/cons of salpingectomy versus fulguration  She would like to proceed with salpingectomy  She has an umbilical hernia and a ?ventral hernia (more likely rectus diastasis) that she would like repaired at the same time  If this cannot happen, she would prefer to avoid two recoveries and her  would undergo vasectomy  But since she will need that surgery either way, she would be willing to have the tubal done if it can be the same recovery as she will already have help lined up        We discussed the procedure with the patient in detail including intraoperative procedure as well as postoperative expectations  Discussed risks of procedure including but not limited to bleeding, transfusion, pain, infection, damage to surrounding organs including bowel/bladder/nerves/vessels/ovaries, laparotomy if injury occurs, risk of failure with increased risk of ectopic pregnancy should failure occur  Patient states understanding of all of above, and all questions were answered  Past Medical History:   Diagnosis Date    Varicella     childhood     Past Surgical History:   Procedure Laterality Date    DENTAL SURGERY      TONSILECTOMY AND ADNOIDECTOMY       Social History   No current outpatient prescriptions on file  Allergies   Allergen Reactions    Clarithromycin GI Intolerance and Nausea Only     Biaxin    Other     Pollen Extract     Seasonal Ic  [Cholestatin]     Short Ragweed Pollen Ext        O:  Blood pressure 106/70, weight 73 kg (161 lb), last menstrual period 05/22/2018, currently breastfeeding  Patient appears well and is not in distress  Cardiovascular exam with regular rate and rhythm  Lungs are clear to auscultation bilaterally  Abdomen is soft and nontender without masses  External genitals are normal without lesions or rashes  Vagina is normal without discharge or bleeding  Cervix is normal without discharge or lesion  Uterus is normal, mobile, nontender without palpable mass  Adnexa are normal, nontender, without palpable mass

## 2018-07-17 ENCOUNTER — OFFICE VISIT (OUTPATIENT)
Dept: PLASTIC SURGERY | Facility: CLINIC | Age: 38
End: 2018-07-17
Payer: COMMERCIAL

## 2018-07-17 VITALS — HEIGHT: 63 IN | WEIGHT: 161.4 LBS | BODY MASS INDEX: 28.6 KG/M2

## 2018-07-17 DIAGNOSIS — M62.08 DIASTASIS RECTI: Primary | ICD-10-CM

## 2018-07-17 PROCEDURE — 99203 OFFICE O/P NEW LOW 30 MIN: CPT | Performed by: SURGERY

## 2018-07-17 RX ORDER — DIPHENOXYLATE HYDROCHLORIDE AND ATROPINE SULFATE 2.5; .025 MG/1; MG/1
1 TABLET ORAL DAILY
COMMUNITY

## 2018-07-17 NOTE — PROGRESS NOTES
Assessment/Plan:  Please see HPI  She is being seen in consultation for diastasis recti  She has been told that she has an umbilical hernia as well  She has had 3 children, ages 10 years, four years, and 3year-old  She is contemplating tubal ligation  In addition to the diastasis recti, she has been told that she has an umbilical hernia  She 1st noticed a bulge in the upper abdomen approximately 1-1/2 years ago, she has noticed that the separation between the medial borders of the rectus abdominus muscle has diminished slightly over the past couple of months since she has been exercising more frequently  We discussed repair of the diastasis recti, this would be approached through a horizontally oriented lower abdominal incision potentially with circumscription  of the umbilicus along its stalk  We also discussed the potential role of abdominoplasty/tummy tuck in regard to excision of the redundant skin and subcutaneous adipose tissue  She is not interested in addressing the appearance of the abdomen i e  abdominoplasty/tummy tuck  I explained to her that with the repair of the diastasis may or may not addressed the bulge above the umbilicus, she is contemplating undergoing simultaneous repair of the umbilical hernia and tubal ligation  She would prefer that we proceed with a letter of medical necessity in regard to preauthorization for diastasis repair, and she will let me know how she would prefer to proceed i e  repair of the umbilical  hernia (Dr Tasha Chaves) and simultaneous tubal ligation ( Dr Desire Jose) along with diastasis repair, or hernia/tubal ligation initially and diastasis repair in a staged fashion  There are no diagnoses linked to this encounter  Subjective:  Diastasis recti, Xuan Kenney     Patient ID: oM Gallegos is a 41 y o  female  HPI she is being seen in consultation for diastasis recti      The following portions of the patient's history were reviewed and updated as appropriate: allergies, current medications, past family history, past medical history, past social history, past surgical history and problem list     Review of Systems   Constitutional: Negative for chills and fever  HENT: Negative for hearing loss  Eyes: Negative for discharge and visual disturbance  Respiratory: Negative for chest tightness and shortness of breath  Cardiovascular: Negative for chest pain and leg swelling  Gastrointestinal: Negative for blood in stool, constipation, diarrhea and nausea  Genitourinary: Negative for dysuria  Musculoskeletal: Negative for gait problem  Skin: Negative for rash  Allergic/Immunologic: Negative for immunocompromised state  Neurological: Negative for seizures and headaches  Hematological: Does not bruise/bleed easily  Psychiatric/Behavioral: Negative for dysphoric mood  The patient is not nervous/anxious  Objective:      Ht 5' 3" (1 6 m)   Wt 73 2 kg (161 lb 6 4 oz)   BMI 28 59 kg/m²          Physical Exam   Constitutional: She appears well-developed and well-nourished  HENT:   Head: Normocephalic  Eyes: Pupils are equal, round, and reactive to light  Abdominal: Soft  There is no tenderness  There is no rebound  Diastasis recti approximately 4 cm between medial borders of rectus abdominus, prominence superior to umbilicus, umbilical hernia which is nontender   Musculoskeletal: Normal range of motion  Neurological: She is alert  Skin: Skin is warm  Psychiatric: She has a normal mood and affect

## 2018-09-19 NOTE — PRE-PROCEDURE INSTRUCTIONS
Pre-Surgery Instructions:   Medication Instructions    multivitamin (THERAGRAN) TABS Instructed patient per Anesthesia Guidelines  Spoke to pt  Medication list reviewed & instructed  As of 9 19 18 pt to stop MV  Instructed on tylenol only  Am DOS no meds  Showering instructions given at time of call  All instructions verbally understood by patient  Pt expressed concern of n/v with anesthesia   Stated she received 'patch' in past

## 2018-09-19 NOTE — H&P
History and Physical -General Surgical Care   Donaldo Brewer 41 y o  female MRN: [id number]  Unit/Bed#:  Encounter: [id number]       Principal Problem:  Epigastric hernia    HPI: Donaldo Brewer is a 41y o  year old female who presents with epigastric hernia  She has noticed this for almost 2 years  She now presents for repair in combination with a laparoscopic tubal ligation  Review of Systems    Historical Information   Past Medical History:   Diagnosis Date    PONV (postoperative nausea and vomiting)     Varicella     childhood     Past Surgical History:   Procedure Laterality Date    DENTAL SURGERY      TONSILECTOMY AND ADNOIDECTOMY       Social History   History   Alcohol Use No     History   Drug Use No     History   Smoking Status    Never Smoker   Smokeless Tobacco    Never Used     Family History   Problem Relation Age of Onset    Asthma Brother     Cancer Maternal Grandmother     Diabetes Paternal Grandfather        Meds/Allergies     No prescriptions prior to admission  No current facility-administered medications for this encounter  Allergies   Allergen Reactions    Clarithromycin GI Intolerance and Nausea Only     Biaxin    Other     Pollen Extract     Seasonal Ic  [Cholestatin]     Short Ragweed Pollen Ext            currently breastfeeding  No intake or output data in the 24 hours ending 09/19/18 1367    PHYSICAL EXAM  General appearance: alert and oriented, in no acute distress  Lungs: clear to auscultation bilaterally  Heart: regular rate and rhythm, S1, S2 normal, no murmur, click, rub or gallop  Abdomen: soft, non-tender; bowel sounds normal; no masses,  no organomegaly  Small epigastric hernia approximately 3 fingerbreadths above her umbilicus  This is soft and reducible    Small rectus diastasis was also noted on exam   Rectal: deferred  Skin: Skin color, texture, turgor normal  No rashes or lesions    Lab Results:   No visits with results within 1 Day(s) from this visit     Latest known visit with results is:   Admission on 05/24/2017, Discharged on 05/26/2017   Component Date Value    ABO Grouping 05/24/2017 O     Rh Factor 05/24/2017 Negative     Antibody Screen 05/24/2017 Negative     Specimen Expiration Date 05/24/2017 98230162     WBC 05/24/2017 15 83*    RBC 05/24/2017 4 17     Hemoglobin 05/24/2017 11 4*    Hematocrit 05/24/2017 35 2     MCV 05/24/2017 84     MCH 05/24/2017 27 3     MCHC 05/24/2017 32 4     RDW 05/24/2017 14 6     MPV 05/24/2017 10 1     Platelets 58/09/6312 245     nRBC 05/24/2017 0     Neutrophils Relative 05/24/2017 76*    Lymphocytes Relative 05/24/2017 15     Monocytes Relative 05/24/2017 7     Eosinophils Relative 05/24/2017 2     Basophils Relative 05/24/2017 0     Neutrophils Absolute 05/24/2017 11 93*    Lymphocytes Absolute 05/24/2017 2 32     Monocytes Absolute 05/24/2017 1 10     Eosinophils Absolute 05/24/2017 0 36     Basophils Absolute 05/24/2017 0 02     RPR 05/24/2017 Non-Reactive     External Strep Group B Ag 05/22/2017 Negative     pH, Cord Art 05/24/2017 7 221*    pCO2, Cord Art 05/24/2017 61 3*    pO2, Cord Art 05/24/2017 12 5     HCO3, Cord Art 05/24/2017 24 6     Base Exc, Cord Art 05/24/2017 -4 6*    O2 Content, Cord Art 05/24/2017 4 2     O2 Hgb, Arterial Cord 05/24/2017 16 5     pH, Cord Jef 05/24/2017 7 266     pCO2, Cord Jef 05/24/2017 51 7*    pO2, Cord Jef 05/24/2017 18 8     HCO3, Cord Jef 05/24/2017 23 0    [facility] Exc, Cord Jef 05/24/2017 -4 7*    O2 Cont, Cord Jef 05/24/2017 8 5     O2 HGB,VENOUS CORD 05/24/2017 33 3     Case Report 05/24/2017                      Value:Surgical Pathology Report                         Case: [id number]                                   Authorizing Provider:  Jennifer Calvin MD     Collected:           05/24/2017 0913              Ordering Location:     [address] [place] [address]      Received:            05/24/2017 [facility] and                                                                                  Delivery                                                                     Pathologist:           Segundo Albrecht MD                                                          Specimen:    Placenta                                                                                   Final Diagnosis 05/24/2017                      Value: This result contains rich text formatting which cannot be displayed here   Note 05/24/2017                      Value: This result contains rich text formatting which cannot be displayed here   Additional Information 05/24/2017                      Value: This result contains rich text formatting which cannot be displayed here  Yadira Rayo Gross Description 05/24/2017                      Value: This result contains rich text formatting which cannot be displayed here   Clinical Information 05/24/2017                      Value:meconium    ABO Grouping 05/25/2017 O     Rh Factor 05/25/2017 Negative     Antibody Screen 05/25/2017 Negative     Fetal Bleed Screen 05/25/2017 Negative      Imaging Studies:     ASSESSMENT:  Epigastric hernia    PLAN:  Risks and benefits for an epigastric hernia repair were discussed with her and she agrees to proceed  This we done in combination with Dr Juan English of gynecology for laparoscopic tubal ligation    Counseling / Coordination of Care  Total time spent today  20 minutes  Greater than 50% of total time was spent with the patient and / or family counseling and / or coordination of care

## 2018-09-24 ENCOUNTER — ANESTHESIA EVENT (OUTPATIENT)
Dept: PERIOP | Facility: HOSPITAL | Age: 38
End: 2018-09-24
Payer: COMMERCIAL

## 2018-09-24 NOTE — H&P
H&P Exam - Gynecology   Zamzam Velázquez 41 y o  female MRN: [id number]  Unit/Bed#:  Encounter: [id number]    No chief complaint on file  HPI:  Zamzam Velázquez is a 41 y o  female who presents with undesired fertility for bilateral salpingectomy for sterilization  She is aware of the permanent and irreversible nature of the procedure as well as all of her alternatives  She wishes to proceed  Review of Systems   All other systems reviewed and are negative  Historical Information   Past Medical History:   Diagnosis Date    PONV (postoperative nausea and vomiting)     Varicella     childhood     Past Surgical History:   Procedure Laterality Date    DENTAL SURGERY      TONSILECTOMY AND ADNOIDECTOMY       OB/GYN History:    Family History   Problem Relation Age of Onset    Asthma Brother     Cancer Maternal Grandmother     Diabetes Paternal Grandfather      Social History   History   Alcohol Use No     History   Drug Use No     History   Smoking Status    Never Smoker   Smokeless Tobacco    Never Used       Meds/Allergies   No prescriptions prior to admission  Allergies   Allergen Reactions    Clarithromycin GI Intolerance and Nausea Only     Biaxin    Other     Pollen Extract     Seasonal Ic  [Cholestatin]     Short Ragweed Pollen Ext        Objective   Vitals: Height 5' 3" (1 6 m), weight 73 kg (161 lb), currently breastfeeding  No intake or output data in the 24 hours ending 09/24/18 1608    Invasive Devices: Invasive Devices          No matching active lines, drains, or airways          Physical Exam   Constitutional: She appears well-developed and well-nourished  Cardiovascular: Normal rate, regular rhythm and normal heart sounds  No murmur heard  Pulmonary/Chest: Effort normal  She has no wheezes  Abdominal: Soft  She exhibits no distension  There is no tenderness  Genitourinary: Vagina normal and uterus normal    Genitourinary Comments:  At the time of last office exam on 6/14/18 - will repeat in OR       Lab Results:   No visits with results within 1 Day(s) from this visit     Latest known visit with results is:   Admission on 05/24/2017, Discharged on 05/26/2017   Component Date Value    ABO Grouping 05/24/2017 O     Rh Factor 05/24/2017 Negative     Antibody Screen 05/24/2017 Negative     Specimen Expiration Date 05/24/2017 [id number]     WBC 05/24/2017 15 83*    RBC 05/24/2017 4 17     Hemoglobin 05/24/2017 11 4*    Hematocrit 05/24/2017 35 2     MCV 05/24/2017 84     MCH 05/24/2017 27 3     MCHC 05/24/2017 32 4     RDW 05/24/2017 14 6     MPV 05/24/2017 10 1     Platelets 16/71/6404 245     nRBC 05/24/2017 0     Neutrophils Relative 05/24/2017 76*    Lymphocytes Relative 05/24/2017 15     Monocytes Relative 05/24/2017 7     Eosinophils Relative 05/24/2017 2     Basophils Relative 05/24/2017 0     Neutrophils Absolute 05/24/2017 11 93*    Lymphocytes Absolute 05/24/2017 2 32     Monocytes Absolute 05/24/2017 1 10     Eosinophils Absolute 05/24/2017 0 36     Basophils Absolute 05/24/2017 0 02     RPR 05/24/2017 Non-Reactive     External Strep Group B Ag 05/22/2017 Negative     pH, Cord Art 05/24/2017 7 221*    pCO2, Cord Art 05/24/2017 61 3*    pO2, Cord Art 05/24/2017 12 5     HCO3, Cord Art 05/24/2017 24 6     Base Exc, Cord Art 05/24/2017 -4 6*    O2 Content, Cord Art 05/24/2017 4 2     O2 Hgb, Arterial Cord 05/24/2017 16 5     pH, Cord Jef 05/24/2017 7 266     pCO2, Cord Jef 05/24/2017 51 7*    pO2, Cord Jef 05/24/2017 18 8     HCO3, Cord Jef 05/24/2017 23 0    [facility] Exc, Cord Jef 05/24/2017 -4 7*    O2 Cont, Cord Jef 05/24/2017 8 5     O2 HGB,VENOUS CORD 05/24/2017 33 3     Case Report 05/24/2017                      Value:Surgical Pathology Report                         Case: [id number]                                   Authorizing Provider:  Eliel Melvin MD     Collected:           05/24/2017 4566 Ordering Location:     [address] [place] [address]      Received:            05/24/2017 [place] and                                                                                  Delivery                                                                     Pathologist:           Veronica Trotter MD                                                          Specimen:    Placenta                                                                                   Final Diagnosis 05/24/2017                      Value: This result contains rich text formatting which cannot be displayed here   Note 05/24/2017                      Value: This result contains rich text formatting which cannot be displayed here   Additional Information 05/24/2017                      Value: This result contains rich text formatting which cannot be displayed here  Katia Mccannfabiola Gross Description 05/24/2017                      Value: This result contains rich text formatting which cannot be displayed here   Clinical Information 05/24/2017                      Value:meconium    ABO Grouping 05/25/2017 O     Rh Factor 05/25/2017 Negative     Antibody Screen 05/25/2017 Negative     Fetal Bleed Screen 05/25/2017 Negative         Assessment/Plan     Assessment:  Patient is 46yo female with undesired fertility  Plan: For laparoscopic bilateral salpingectomy for sterilization  Combined surgery with general surgery  Routine preoperative management - no antibiotics indicated for tubal sterilization surgery

## 2018-09-25 ENCOUNTER — ANESTHESIA (OUTPATIENT)
Dept: PERIOP | Facility: HOSPITAL | Age: 38
End: 2018-09-25
Payer: COMMERCIAL

## 2018-09-25 ENCOUNTER — HOSPITAL ENCOUNTER (OUTPATIENT)
Facility: HOSPITAL | Age: 38
Setting detail: OUTPATIENT SURGERY
Discharge: HOME | End: 2018-09-25
Attending: SURGERY | Admitting: SURGERY
Payer: COMMERCIAL

## 2018-09-25 VITALS
DIASTOLIC BLOOD PRESSURE: 71 MMHG | HEART RATE: 64 BPM | OXYGEN SATURATION: 100 % | WEIGHT: 160 LBS | RESPIRATION RATE: 16 BRPM | SYSTOLIC BLOOD PRESSURE: 117 MMHG | TEMPERATURE: 98.4 F | HEIGHT: 63 IN | BODY MASS INDEX: 28.35 KG/M2

## 2018-09-25 DIAGNOSIS — K43.9 VENTRAL HERNIA WITHOUT OBSTRUCTION OR GANGRENE: ICD-10-CM

## 2018-09-25 PROCEDURE — 88302 TISSUE EXAM BY PATHOLOGIST: CPT | Performed by: PATHOLOGY

## 2018-09-25 PROCEDURE — 58661 LAPAROSCOPY REMOVE ADNEXA: CPT | Performed by: OBSTETRICS & GYNECOLOGY

## 2018-09-25 RX ORDER — LIDOCAINE HYDROCHLORIDE 10 MG/ML
INJECTION, SOLUTION INFILTRATION; PERINEURAL AS NEEDED
Status: DISCONTINUED | OUTPATIENT
Start: 2018-09-25 | End: 2018-09-25 | Stop reason: SURG

## 2018-09-25 RX ORDER — FENTANYL CITRATE 50 UG/ML
INJECTION, SOLUTION INTRAMUSCULAR; INTRAVENOUS AS NEEDED
Status: DISCONTINUED | OUTPATIENT
Start: 2018-09-25 | End: 2018-09-25 | Stop reason: SURG

## 2018-09-25 RX ORDER — BUPIVACAINE HYDROCHLORIDE AND EPINEPHRINE 2.5; 5 MG/ML; UG/ML
INJECTION, SOLUTION EPIDURAL; INFILTRATION; INTRACAUDAL; PERINEURAL AS NEEDED
Status: DISCONTINUED | OUTPATIENT
Start: 2018-09-25 | End: 2018-09-25 | Stop reason: HOSPADM

## 2018-09-25 RX ORDER — OXYCODONE HYDROCHLORIDE AND ACETAMINOPHEN 5; 325 MG/1; MG/1
2 TABLET ORAL EVERY 4 HOURS PRN
Status: DISCONTINUED | OUTPATIENT
Start: 2018-09-25 | End: 2018-09-25 | Stop reason: HOSPADM

## 2018-09-25 RX ORDER — ONDANSETRON 2 MG/ML
4 INJECTION INTRAMUSCULAR; INTRAVENOUS ONCE AS NEEDED
Status: COMPLETED | OUTPATIENT
Start: 2018-09-25 | End: 2018-09-25

## 2018-09-25 RX ORDER — ROCURONIUM BROMIDE 10 MG/ML
INJECTION, SOLUTION INTRAVENOUS AS NEEDED
Status: DISCONTINUED | OUTPATIENT
Start: 2018-09-25 | End: 2018-09-25 | Stop reason: SURG

## 2018-09-25 RX ORDER — MAGNESIUM HYDROXIDE 1200 MG/15ML
LIQUID ORAL AS NEEDED
Status: DISCONTINUED | OUTPATIENT
Start: 2018-09-25 | End: 2018-09-25 | Stop reason: HOSPADM

## 2018-09-25 RX ORDER — METOCLOPRAMIDE HYDROCHLORIDE 5 MG/ML
10 INJECTION INTRAMUSCULAR; INTRAVENOUS ONCE AS NEEDED
Status: DISCONTINUED | OUTPATIENT
Start: 2018-09-25 | End: 2018-09-25 | Stop reason: HOSPADM

## 2018-09-25 RX ORDER — GLYCOPYRROLATE 0.2 MG/ML
INJECTION INTRAMUSCULAR; INTRAVENOUS AS NEEDED
Status: DISCONTINUED | OUTPATIENT
Start: 2018-09-25 | End: 2018-09-25 | Stop reason: SURG

## 2018-09-25 RX ORDER — SCOLOPAMINE TRANSDERMAL SYSTEM 1 MG/1
1 PATCH, EXTENDED RELEASE TRANSDERMAL
Status: DISCONTINUED | OUTPATIENT
Start: 2018-09-25 | End: 2018-09-25 | Stop reason: HOSPADM

## 2018-09-25 RX ORDER — SODIUM CHLORIDE, SODIUM LACTATE, POTASSIUM CHLORIDE, CALCIUM CHLORIDE 600; 310; 30; 20 MG/100ML; MG/100ML; MG/100ML; MG/100ML
125 INJECTION, SOLUTION INTRAVENOUS CONTINUOUS
Status: DISCONTINUED | OUTPATIENT
Start: 2018-09-25 | End: 2018-09-25 | Stop reason: HOSPADM

## 2018-09-25 RX ORDER — PROPOFOL 10 MG/ML
INJECTION, EMULSION INTRAVENOUS AS NEEDED
Status: DISCONTINUED | OUTPATIENT
Start: 2018-09-25 | End: 2018-09-25 | Stop reason: SURG

## 2018-09-25 RX ORDER — OXYCODONE HYDROCHLORIDE AND ACETAMINOPHEN 5; 325 MG/1; MG/1
2 TABLET ORAL EVERY 4 HOURS PRN
Qty: 28 TABLET | Refills: 0 | Status: SHIPPED | OUTPATIENT
Start: 2018-09-25 | End: 2019-03-14 | Stop reason: ALTCHOICE

## 2018-09-25 RX ORDER — ONDANSETRON 2 MG/ML
4 INJECTION INTRAMUSCULAR; INTRAVENOUS EVERY 4 HOURS PRN
Status: DISCONTINUED | OUTPATIENT
Start: 2018-09-25 | End: 2018-09-25 | Stop reason: HOSPADM

## 2018-09-25 RX ORDER — PROPOFOL 10 MG/ML
INJECTION, EMULSION INTRAVENOUS CONTINUOUS PRN
Status: DISCONTINUED | OUTPATIENT
Start: 2018-09-25 | End: 2018-09-25 | Stop reason: SURG

## 2018-09-25 RX ORDER — FENTANYL CITRATE/PF 50 MCG/ML
50 SYRINGE (ML) INJECTION
Status: DISCONTINUED | OUTPATIENT
Start: 2018-09-25 | End: 2018-09-25 | Stop reason: HOSPADM

## 2018-09-25 RX ORDER — MIDAZOLAM HYDROCHLORIDE 1 MG/ML
INJECTION INTRAMUSCULAR; INTRAVENOUS AS NEEDED
Status: DISCONTINUED | OUTPATIENT
Start: 2018-09-25 | End: 2018-09-25 | Stop reason: SURG

## 2018-09-25 RX ORDER — MORPHINE SULFATE 4 MG/ML
4 INJECTION, SOLUTION INTRAMUSCULAR; INTRAVENOUS
Status: DISCONTINUED | OUTPATIENT
Start: 2018-09-25 | End: 2018-09-25 | Stop reason: HOSPADM

## 2018-09-25 RX ORDER — ONDANSETRON 2 MG/ML
INJECTION INTRAMUSCULAR; INTRAVENOUS AS NEEDED
Status: DISCONTINUED | OUTPATIENT
Start: 2018-09-25 | End: 2018-09-25 | Stop reason: SURG

## 2018-09-25 RX ADMIN — NEOSTIGMINE METHYLSULFATE 3 MG: 1 INJECTION, SOLUTION INTRAMUSCULAR; INTRAVENOUS; SUBCUTANEOUS at 14:45

## 2018-09-25 RX ADMIN — ROCURONIUM BROMIDE 40 MG: 10 INJECTION INTRAVENOUS at 13:54

## 2018-09-25 RX ADMIN — CEFAZOLIN SODIUM 1000 MG: 1 SOLUTION INTRAVENOUS at 13:48

## 2018-09-25 RX ADMIN — SODIUM CHLORIDE, SODIUM LACTATE, POTASSIUM CHLORIDE, AND CALCIUM CHLORIDE 125 ML/HR: .6; .31; .03; .02 INJECTION, SOLUTION INTRAVENOUS at 12:15

## 2018-09-25 RX ADMIN — FENTANYL CITRATE 50 MCG: 50 INJECTION, SOLUTION INTRAMUSCULAR; INTRAVENOUS at 14:35

## 2018-09-25 RX ADMIN — SCOPOLAMINE 1 PATCH: 1 PATCH, EXTENDED RELEASE TRANSDERMAL at 12:28

## 2018-09-25 RX ADMIN — ROCURONIUM BROMIDE 10 MG: 10 INJECTION INTRAVENOUS at 14:20

## 2018-09-25 RX ADMIN — PROPOFOL 150 MCG/KG/MIN: 10 INJECTION, EMULSION INTRAVENOUS at 14:00

## 2018-09-25 RX ADMIN — SODIUM CHLORIDE, SODIUM LACTATE, POTASSIUM CHLORIDE, AND CALCIUM CHLORIDE 125 ML/HR: .6; .31; .03; .02 INJECTION, SOLUTION INTRAVENOUS at 15:38

## 2018-09-25 RX ADMIN — MIDAZOLAM 2 MG: 1 INJECTION INTRAMUSCULAR; INTRAVENOUS at 13:45

## 2018-09-25 RX ADMIN — PROPOFOL 160 MG: 10 INJECTION, EMULSION INTRAVENOUS at 13:54

## 2018-09-25 RX ADMIN — GLYCOPYRROLATE 0.5 MG: 0.2 INJECTION, SOLUTION INTRAMUSCULAR; INTRAVENOUS at 14:45

## 2018-09-25 RX ADMIN — DEXAMETHASONE SODIUM PHOSPHATE 10 MG: 10 INJECTION, SOLUTION INTRAMUSCULAR; INTRAVENOUS at 14:07

## 2018-09-25 RX ADMIN — FENTANYL CITRATE 50 MCG: 50 INJECTION, SOLUTION INTRAMUSCULAR; INTRAVENOUS at 14:15

## 2018-09-25 RX ADMIN — LIDOCAINE HYDROCHLORIDE 50 MG: 10 INJECTION, SOLUTION INFILTRATION; PERINEURAL at 13:54

## 2018-09-25 RX ADMIN — ONDANSETRON 4 MG: 2 INJECTION INTRAMUSCULAR; INTRAVENOUS at 14:44

## 2018-09-25 RX ADMIN — FENTANYL CITRATE 50 MCG: 50 INJECTION, SOLUTION INTRAMUSCULAR; INTRAVENOUS at 13:54

## 2018-09-25 RX ADMIN — ONDANSETRON 4 MG: 2 INJECTION INTRAMUSCULAR; INTRAVENOUS at 15:35

## 2018-09-25 NOTE — OP NOTE
OPERATIVE REPORT  PATIENT NAME: Adrian Bob    :  1980  MRN: [id number]  Pt Location: [facility] OR ROOM 08    SURGERY DATE: 2018    Surgeon(s) and Role:  Panel 1:     * Wale Lee DO - Primary     * Davonte Garsia MD - Assisting    Panel 2:     Parth Allison MD - Primary    Preop Diagnosis:  Ventral hernia without obstruction or gangrene [K43 9],     Post-Op Diagnosis Codes: * Ventral hernia without obstruction or gangrene [K43 9]    Procedure(s) (LRB):  REPAIR HERNIA EPIGASTRIC (N/A)  LAPAROSCOPIC SALPINGECTOMY (Bilateral)  (no mesh for hernia repair)    Specimen(s):  ID Type Source Tests Collected by Time Destination   1 : leila fallopian tubes Tissue Fallopian Tubes, Bilateral TISSUE EXAM Galo Guerrero RN 2018 [id number]        Estimated Blood Loss:   Minimal    Drains:       Anesthesia Type:   General    Operative Indications:  Ventral hernia without obstruction or gangrene [K43 9]      Operative Findings:  Small epigastric hernia 3 fingerbreadths above the umbilicus  Primary repair was performed    Review of Systems/Medical History  Patient summary reviewed  Chart reviewed  History of anesthetic complications PONV    Cardiovascular  Negative cardio ROS  Pulmonary  Negative pulmonary ROS       GI/Hepatic  Negative GI/hepatic ROS              Endo/Other    GYN      Hematology Musculoskeletal      Neurology Psychology      Height 63 in weight 73 kg/160 lb BMI 28  ASA 1  Wound class 1  Complications:   None    Procedure and Technique:  Patient was brought the ER proceed identified by visualization, conversation, by armband by both Dr Neha Hendricks and myself  Sequential compression pumps were placed  She received perioperative antibiotics  Once under general anesthesia she was placed in stirrups by the gynecology team   Abdomen is then prepped and draped in a sterile fashion  Time-out was performed was assured that the prep was dry   Local was instilled 3 fingerbreadths above the umbilicus skin incision was made underlying subcutaneous tissues were divided with hot cautery to expose the ventral hernia  There was some preperitoneal fat within it  This was reduced  Through this a [place]s trocar was then placed and secured with 0 Vicryl at each end of the opening  Dr Kristen Yañez then proceeded with the bilateral salpingectomy  This will be dictated separately by her  A once completed the trocars removed  Fascia of the epigastric port was closed with 2 0 Vicryl in a figure-of-eight fashion  The anchoring sutures for the Santa Lipps trocar also tied down  Irrigation was carried out local was instilled  Four Monocryl was used to close all skin incisions in a subcuticular fashion  Wounds were washed and dried  Sterile histoacryl was applied  She was awakened in the operating returned to the recovery area in stable condition having tolerated the procedure well     I was present for the entire procedure    Patient Disposition:  PACU     SIGNATURE: Enrrique Kurtz DO  DATE: September 25, 2018  TIME: 2:48 PM

## 2018-09-25 NOTE — ANESTHESIA POSTPROCEDURE EVALUATION
Post-Op Assessment Note      CV Status:  Stable    Mental Status:  Alert and awake    Hydration Status:  Euvolemic    PONV Controlled:  Controlled    Airway Patency:  Patent    Post Op Vitals Reviewed: Yes          Staff: CRNA       Comments: Pt resting without complaints  VSS  Denies c/o nausea or pain            /77 (09/25/18 1506)    Temp 97 8 °F (36 6 °C) (09/25/18 1506)    Pulse 77 (09/25/18 1506)   Resp 12 (09/25/18 1506)    SpO2 100 % (09/25/18 1506)

## 2018-09-25 NOTE — DISCHARGE INSTRUCTIONS
Please call the office when you leave to schedule an appointment to be seen in 2-3 weeks    Activity:    Do not lift more than 25 pounds (a gallon of milk) for 4 weeks post-operatively                 Walking is encouraged  Normal daily activities including climbing steps are okay  Do not engage in strenuous activity or contact sports for 4 weeks post-operatively    Return to work:   Return to work to be discussed at first post-operative visit    Diet:   You may return to your normal heart healthy diet    Wound Care: Your wound is closed with histoacryl  It is okay to shower  Wash incision gently with soap and water and pat dry  Do not soak incisions in bath water or swim for two weeks  Do not apply any creams or ointments  May apply ice as needed to wounds    Pain Medication:   Please take as directed  No driving while taking narcotic pain medications    May use ibuprofen or Tylenol for pain    Other:  If you have questions after discharge please call the office      If you have increased pain, fever >101 5, increased drainage, redness or a bad smell at your surgery site, please call us immediately or come directly to the Emergency Room

## 2018-09-26 NOTE — OP NOTE
OPERATIVE REPORT  PATIENT NAME: Sharyle Jacobson    :  1980  MRN: [id number]  Pt Location: [facility] OR ROOM 08    SURGERY DATE: 2018    Surgeon(s) and Role:  Panel 1:     * Enrrique Kurtz DO - Primary     * MD Brenda - Assisting    Panel 2:     Alicia Guzmán MD - Primary    Preop Diagnosis:  Ventral hernia without obstruction or gangrene [K43 9]    Post-Op Diagnosis Codes: * Ventral hernia without obstruction or gangrene [K43 9]    Procedure(s) (LRB):  REPAIR HERNIA EPIGASTRIC (N/A)  LAPAROSCOPIC SALPINGECTOMY (Bilateral)    Specimen(s):  ID Type Source Tests Collected by Time Destination   1 : leila fallopian tubes Tissue Fallopian Tubes, Bilateral TISSUE EXAM Mata Ac RN 2018 [id number]        Estimated Blood Loss:   Minimal    Drains:       Anesthesia Type:   General    Operative Indications:  Ventral hernia without obstruction or gangrene [K43 9]  Patient desires permanent sterilization    Operative Findings:  EUA revealed anteverted uterus, small, mobile; no adnexal masses  Normal uterus, tubes and ovaries    Complications:   None    Procedure and Technique:  Please see Dr Marlene Cash note regarding the initiation of the procedure including the placement of the initial trocar  While Dr Leslye Olivier was entering the peritoneal cavity, I turned my attention to the vagina  A weighted retractor was placed into the vagina, and a Raj retractor was used to visualize the cervix  The anterior lip of the cervix was grasped with a single tooth tenaculum and the uterus was sounded  The cervix was dilated to a #17 parra dilator and the HUMI uterine manipulator was inserted without difficulty  The balloon was inflated with 10cc of air  All other instruments were removed from the vagina  Once the initial trocar was placed, the gas tubing was connected and gas flow activated to achieve a pneumoperitoneum with a maximum of 15mmHg    The location of the inferior epigastric vessels was confirmed  The placement of bilateral 5mm trocars was carried out in the following manner  The skin was infiltrated with 0 5% marcaine at a location approximately 1/3 of the way from the anterior superior iliac spine to the umbilicus and lateral to the vessels  The skin was incised using a #11 scalpel and the 5mm trocars were placed under direct visualization  The attention was turned to the pelvis with the above findings noted  The following procedure was repeated bilaterally  The fimbriated end of the tubes were grasped and the Enseal was used to coagulate and transect the mesosalpinx just under the tube  This incision was carried medially to the cornual region of the fallopian tubes  The tubes were then coagulated and transected  Good hemostasis was noted  The tubes were removed through the central trocar  The lateral ports were removed under direct visualization  The pneumoperitoneum was then relieved and the central trocar removed  Dr Diana Yun then completed his portion of the case  Please see his dictation for details of this procedure         I was present for the entire procedure    Patient Disposition:  PACU     SIGNATURE: Dandre Sam MD  DATE: September 26, 2018  TIME: 5:17 PM

## 2018-10-08 ENCOUNTER — TELEPHONE (OUTPATIENT)
Dept: OBGYN CLINIC | Facility: CLINIC | Age: 38
End: 2018-10-08

## 2018-10-08 NOTE — TELEPHONE ENCOUNTER
Patient had surgery to have hernia removed and at the same time Dr Gurvinder Rea preformed a tubal ligation  Patient said she only received post-op instructions from her general surgeon and wanted to know if there was anything she needed to do regarding the tubal ligation

## 2019-03-14 ENCOUNTER — ANNUAL EXAM (OUTPATIENT)
Dept: OBGYN CLINIC | Facility: CLINIC | Age: 39
End: 2019-03-14
Payer: COMMERCIAL

## 2019-03-14 VITALS
DIASTOLIC BLOOD PRESSURE: 68 MMHG | HEIGHT: 63 IN | WEIGHT: 148 LBS | SYSTOLIC BLOOD PRESSURE: 116 MMHG | BODY MASS INDEX: 26.22 KG/M2

## 2019-03-14 DIAGNOSIS — Z01.419 ENCOUNTER FOR GYNECOLOGICAL EXAMINATION WITHOUT ABNORMAL FINDING: Primary | ICD-10-CM

## 2019-03-14 PROBLEM — E04.2 MULTIPLE THYROID NODULES: Status: ACTIVE | Noted: 2019-02-20

## 2019-03-14 PROBLEM — E01.0 THYROMEGALY: Status: ACTIVE | Noted: 2019-02-20

## 2019-03-14 PROBLEM — F41.9 ANXIETY: Status: ACTIVE | Noted: 2019-01-31

## 2019-03-14 PROCEDURE — 99395 PREV VISIT EST AGE 18-39: CPT | Performed by: OBSTETRICS & GYNECOLOGY

## 2019-03-14 NOTE — PROGRESS NOTES
Terrell Alvarez  1980      CC:  Yearly exam    S:  42 y o  female here for yearly exam  Her cycles are regular, not heavy or crampy  She is sexually active  She uses her BTL for contraception  She has been working out with the [employer] for almost a year and still loves it        Last Pap 10/13/2016 - normal/negative HPV      Current Outpatient Medications:     multivitamin (THERAGRAN) TABS, Take 1 tablet by mouth daily, Disp: , Rfl:   Social History     Socioeconomic History    Marital status: /Civil Union     Spouse name: Not on file    Number of children: Not on file    Years of education: Not on file    Highest education level: Not on file   Occupational History    Not on file   Social Needs    Financial resource strain: Not on file    Food insecurity:     Worry: Not on file     Inability: Not on file    Transportation needs:     Medical: Not on file     Non-medical: Not on file   Tobacco Use    Smoking status: Never Smoker    Smokeless tobacco: Never Used   Substance and Sexual Activity    Alcohol use: No     Comment: occasionally    Drug use: No    Sexual activity: Yes     Partners: Male     Birth control/protection: Female Sterilization     Comment:    Lifestyle    Physical activity:     Days per week: Not on file     Minutes per session: Not on file    Stress: Not on file   Relationships    Social connections:     Talks on phone: Not on file     Gets together: Not on file     Attends [employer] service: Not on file     Active member of club or organization: Not on file     Attends meetings of clubs or organizations: Not on file     Relationship status: Not on file    Intimate partner violence:     Fear of current or ex partner: Not on file     Emotionally abused: Not on file     Physically abused: Not on file     Forced sexual activity: Not on file   Other Topics Concern    Not on file   Social History Narrative    Not on file     Family History   Problem Relation Age of Onset    Asthma Brother     Diabetes Brother     Cancer Maternal Grandmother     Diabetes Paternal Grandfather     No Known Problems Mother     No Known Problems Father       Past Medical History:   Diagnosis Date    Enlarged thyroid     nodules found in thyroid per patient    PONV (postoperative nausea and vomiting)     Varicella     childhood        O:  Blood pressure 116/68, height 5' 2 5" (1 588 m), weight 67 1 kg (148 lb), last menstrual period 02/25/2019, currently breastfeeding  Patient appears well and is not in distress  Neck is supple without masses  Breasts are symmetrical without mass, tenderness, nipple discharge, skin changes or adenopathy  Abdomen is soft and nontender without masses  External genitals are normal without lesions or rashes  Vagina is normal without discharge or bleeding  Cervix is normal without discharge or lesion  Uterus is normal, mobile, nontender without palpable mass  Adnexa are normal, nontender, without palpable mass  A:  Yearly exam      P:   Pap due 2021   RTO one year for yearly exam or sooner as needed

## 2020-07-15 ENCOUNTER — ANNUAL EXAM (OUTPATIENT)
Dept: OBGYN CLINIC | Facility: CLINIC | Age: 40
End: 2020-07-15
Payer: COMMERCIAL

## 2020-07-15 VITALS
SYSTOLIC BLOOD PRESSURE: 100 MMHG | HEIGHT: 63 IN | DIASTOLIC BLOOD PRESSURE: 60 MMHG | WEIGHT: 150 LBS | TEMPERATURE: 97.2 F | BODY MASS INDEX: 26.58 KG/M2

## 2020-07-15 DIAGNOSIS — Z01.419 ENCOUNTER FOR GYNECOLOGICAL EXAMINATION (GENERAL) (ROUTINE) WITHOUT ABNORMAL FINDINGS: Primary | ICD-10-CM

## 2020-07-15 DIAGNOSIS — N92.0 MENORRHAGIA WITH REGULAR CYCLE: ICD-10-CM

## 2020-07-15 DIAGNOSIS — B37.9 CANDIDIASIS: ICD-10-CM

## 2020-07-15 PROBLEM — E78.5 HYPERLIPIDEMIA: Status: ACTIVE | Noted: 2019-09-23

## 2020-07-15 PROCEDURE — 99395 PREV VISIT EST AGE 18-39: CPT | Performed by: OBSTETRICS & GYNECOLOGY

## 2020-07-15 RX ORDER — CITALOPRAM 10 MG/1
TABLET ORAL
COMMUNITY
Start: 2020-06-01

## 2020-07-15 RX ORDER — FLUCONAZOLE 150 MG/1
150 TABLET ORAL
Qty: 2 TABLET | Refills: 0 | Status: SHIPPED | OUTPATIENT
Start: 2020-07-15 | End: 2020-07-21

## 2020-07-15 NOTE — PROGRESS NOTES
Terrell Penalozabrad  1980      CC:  Yearly exam    S:  47 y o  female here for yearly exam  Her cycles are regular, not crampy  They have gotten a little bit heavier over the past few months associated with increased premenstrual mood symptoms and breast tenderness  Discussed could be related to COVID stress or could be related to age  Discussed precautions of when to call  She will monitor for now as it is not at a level where she would consider treatment  She has also noticed a lot of thick white discharge with intermittant itching  She has a [address] and has been in a wet bathing suit a lot  Discussed behavioral modifications  Sexual activity: She is sexually active without pain, bleeding or dryness  Contraception: She uses her tubal for contraception  Last Pap 10/31/2016 - normal/negative HPV  Last Mammo never    We reviewed [facility] guidelines for Pap testing today         Current Outpatient Medications:     citalopram (CeleXA) 10 mg tablet, TAKE 1 TAB BY MOUTH DAILY FOR MOOD, Disp: , Rfl:     multivitamin (THERAGRAN) TABS, Take 1 tablet by mouth daily, Disp: , Rfl:   Social History     Socioeconomic History    Marital status: /Civil Union     Spouse name: Not on file    Number of children: Not on file    Years of education: Not on file    Highest education level: Not on file   Occupational History    Not on file   Social Needs    Financial resource strain: Not on file    Food insecurity:     Worry: Not on file     Inability: Not on file    Transportation needs:     Medical: Not on file     Non-medical: Not on file   Tobacco Use    Smoking status: Never Smoker    Smokeless tobacco: Never Used   Substance and Sexual Activity    Alcohol use: Yes     Frequency: 2-3 times a week     Drinks per session: 1 or 2     Binge frequency: Never     Comment: weekends    Drug use: No    Sexual activity: Yes     Partners: Male     Birth control/protection: Female Sterilization Comment:    Lifestyle    Physical activity:     Days per week: Not on file     Minutes per session: Not on file    Stress: Not on file   Relationships    Social connections:     Talks on phone: Not on file     Gets together: Not on file     Attends Gnosticist service: Not on file     Active member of club or organization: Not on file     Attends meetings of clubs or organizations: Not on file     Relationship status: Not on file    Intimate partner violence:     Fear of current or ex partner: Not on file     Emotionally abused: Not on file     Physically abused: Not on file     Forced sexual activity: Not on file   Other Topics Concern    Not on file   Social History Narrative    Not on file     Family History   Problem Relation Age of Onset    Asthma Brother     Diabetes Brother     Cancer Maternal Grandmother     Diabetes Paternal Grandfather     No Known Problems Mother     No Known Problems Father       Past Medical History:   Diagnosis Date    Anxiety     Enlarged thyroid     nodules found in thyroid per patient    PONV (postoperative nausea and vomiting)     Varicella     childhood        Review of Systems   Respiratory: Negative  Cardiovascular: Negative  Gastrointestinal: Negative for constipation and diarrhea  Genitourinary: Negative for difficulty urinating, pelvic pain, vaginal bleeding, vaginal discharge, itching or odor  O:  Blood pressure 100/60, temperature (!) 97 2 °F (36 2 °C), height 5' 3" (1 6 m), weight 68 kg (150 lb), last menstrual period 07/07/2020, currently breastfeeding  Patient appears well and is not in distress  Neck is supple; stable thyroid nodule on left; otherwise normal thyroid  Breasts are symmetrical without mass, tenderness, nipple discharge, skin changes or adenopathy  Abdomen is soft and nontender without masses  External genitals are normal without lesions or rashes    Urethral meatus and urethra are normal  Bladder is normal to palpation  Vagina is normal without discharge or bleeding  +thick white discharge consistent with yeast  Cervix is normal without discharge or lesion  Uterus is normal, mobile, nontender without palpable mass  Adnexa are normal, nontender, without palpable mass  A:  Yearly exam      P:   Pap due 2021   Diflucan PO sent to pharmacy   RTO one year for yearly exam or sooner as needed

## 2020-12-20 ENCOUNTER — NURSE TRIAGE (OUTPATIENT)
Dept: OTHER | Facility: OTHER | Age: 40
End: 2020-12-20

## 2020-12-20 DIAGNOSIS — Z20.828 SARS-ASSOCIATED CORONAVIRUS EXPOSURE: Primary | ICD-10-CM

## 2020-12-21 DIAGNOSIS — Z20.828 SARS-ASSOCIATED CORONAVIRUS EXPOSURE: ICD-10-CM

## 2020-12-21 PROCEDURE — U0003 INFECTIOUS AGENT DETECTION BY NUCLEIC ACID (DNA OR RNA); SEVERE ACUTE RESPIRATORY SYNDROME CORONAVIRUS 2 (SARS-COV-2) (CORONAVIRUS DISEASE [COVID-19]), AMPLIFIED PROBE TECHNIQUE, MAKING USE OF HIGH THROUGHPUT TECHNOLOGIES AS DESCRIBED BY CMS-2020-01-R: HCPCS | Performed by: FAMILY MEDICINE

## 2020-12-22 LAB — SARS-COV-2 RNA SPEC QL NAA+PROBE: DETECTED

## 2021-07-27 ENCOUNTER — ANNUAL EXAM (OUTPATIENT)
Dept: OBGYN CLINIC | Facility: CLINIC | Age: 41
End: 2021-07-27
Payer: COMMERCIAL

## 2021-07-27 VITALS
HEIGHT: 64 IN | BODY MASS INDEX: 24.24 KG/M2 | SYSTOLIC BLOOD PRESSURE: 100 MMHG | WEIGHT: 142 LBS | DIASTOLIC BLOOD PRESSURE: 66 MMHG

## 2021-07-27 DIAGNOSIS — Z01.419 ENCOUNTER FOR GYNECOLOGICAL EXAMINATION (GENERAL) (ROUTINE) WITHOUT ABNORMAL FINDINGS: Primary | ICD-10-CM

## 2021-07-27 DIAGNOSIS — Z12.31 ENCOUNTER FOR SCREENING MAMMOGRAM FOR MALIGNANT NEOPLASM OF BREAST: ICD-10-CM

## 2021-07-27 PROCEDURE — G0476 HPV COMBO ASSAY CA SCREEN: HCPCS | Performed by: OBSTETRICS & GYNECOLOGY

## 2021-07-27 PROCEDURE — G0145 SCR C/V CYTO,THINLAYER,RESCR: HCPCS | Performed by: OBSTETRICS & GYNECOLOGY

## 2021-07-27 PROCEDURE — 99395 PREV VISIT EST AGE 18-39: CPT | Performed by: OBSTETRICS & GYNECOLOGY

## 2021-07-27 NOTE — PROGRESS NOTES
Jus Duran  1980      CC:  Yearly exam    S:  46 y o  female here for yearly exam  Her cycles are regular, not crampy  They have gotten more condensed so the flow is heavier than it was but not excessive  It has been stable over the last year, and she is aware to call for worsening bleeding  She sees endocrine at [facility] for her thyroid enlargement  She is followed with ultrasound, and the results are stable  Sexual activity: She is sexually active without pain, bleeding or dryness  Contraception: She uses her tubal for contraception  Last Pap 10/13/2016 - normal/negative HPV  Last Mammo - never    We reviewed [facility] guidelines for Pap testing today  Current Outpatient Medications:     citalopram (CeleXA) 10 mg tablet, TAKE 1 TAB BY MOUTH DAILY FOR MOOD, Disp: , Rfl:     multivitamin (THERAGRAN) TABS, Take 1 tablet by mouth daily, Disp: , Rfl:   Social History     Socioeconomic History    Marital status: /Civil Union     Spouse name: Not on file    Number of children: Not on file    Years of education: Not on file    Highest education level: Not on file   Occupational History    Not on file   Tobacco Use    Smoking status: Never Smoker    Smokeless tobacco: Never Used   Vaping Use    Vaping Use: Never used   Substance and Sexual Activity    Alcohol use: Yes     Comment: weekends    Drug use: No    Sexual activity: Yes     Partners: Male     Birth control/protection: Female Sterilization     Comment:    Other Topics Concern    Not on file   Social History Narrative    Not on file     Social Determinants of Health     Financial Resource Strain:     Difficulty of Paying Living Expenses:    Food Insecurity:     Worried About Running Out of Food in the Last Year:     [address] in the Last Year:    Transportation Needs:     Lack of Transportation (Medical):      Lack of Transportation (Non-Medical):    Physical Activity:     Days of Exercise per Week:  Minutes of Exercise per Session:    Stress:     Feeling of Stress :    Social Connections:     Frequency of Communication with Friends and Family:     Frequency of Social Gatherings with Friends and Family:     Attends [employer] Services:     Active Member of Clubs or Organizations:     Attends Club or Organization Meetings:     Marital Status:    Intimate Partner Violence:     Fear of Current or Ex-Partner:     Emotionally Abused:     Physically Abused:     Sexually Abused:      Family History   Problem Relation Age of Onset    Asthma Brother     Diabetes Brother     Cancer Maternal Grandmother     Diabetes Paternal Grandfather     No Known Problems Mother     No Known Problems Father       Past Medical History:   Diagnosis Date    Anxiety     Enlarged thyroid     nodules found in thyroid per patient    PONV (postoperative nausea and vomiting)     Varicella     childhood        Review of Systems   Respiratory: Negative  Cardiovascular: Negative  Gastrointestinal: Negative for constipation and diarrhea  Genitourinary: Negative for difficulty urinating, pelvic pain, vaginal bleeding, vaginal discharge, itching or odor  O:  Blood pressure 100/66, height 5' 3 5" (1 613 m), weight 64 4 kg (142 lb), last menstrual period 07/10/2021, currently breastfeeding  Patient appears well and is not in distress  Neck is supple without masses; +enlarged thyroid - [facility] endocrine at [facility]  Breasts are symmetrical without mass, tenderness, nipple discharge, skin changes or adenopathy  Abdomen is soft and nontender without masses  External genitals are normal without lesions or rashes  Urethral meatus and urethra are normal  Bladder is normal to palpation  Vagina is normal without discharge or bleeding  Cervix is normal without discharge or lesion  Uterus is normal, mobile, nontender without palpable mass  Adnexa are normal, nontender, without palpable mass       A:  Yearly exam  P:   Pap with HPV done - will call with results   RTO one year for yearly exam or sooner as needed

## 2021-07-28 LAB
HPV HR 12 DNA CVX QL NAA+PROBE: NEGATIVE
HPV16 DNA CVX QL NAA+PROBE: NEGATIVE
HPV18 DNA CVX QL NAA+PROBE: NEGATIVE

## 2021-08-03 LAB
LAB AP GYN PRIMARY INTERPRETATION: NORMAL
LAB AP LMP: NORMAL
Lab: NORMAL

## 2022-09-01 ENCOUNTER — ANNUAL EXAM (OUTPATIENT)
Dept: OBGYN CLINIC | Facility: CLINIC | Age: 42
End: 2022-09-01
Payer: COMMERCIAL

## 2022-09-01 VITALS
WEIGHT: 149 LBS | BODY MASS INDEX: 26.4 KG/M2 | DIASTOLIC BLOOD PRESSURE: 70 MMHG | HEIGHT: 63 IN | SYSTOLIC BLOOD PRESSURE: 130 MMHG

## 2022-09-01 DIAGNOSIS — Z12.31 ENCOUNTER FOR SCREENING MAMMOGRAM FOR MALIGNANT NEOPLASM OF BREAST: ICD-10-CM

## 2022-09-01 DIAGNOSIS — Z01.419 ENCOUNTER FOR GYNECOLOGICAL EXAMINATION (GENERAL) (ROUTINE) WITHOUT ABNORMAL FINDINGS: Primary | ICD-10-CM

## 2022-09-01 PROBLEM — K43.9 VENTRAL HERNIA: Status: RESOLVED | Noted: 2017-07-06 | Resolved: 2022-09-01

## 2022-09-01 PROCEDURE — 99396 PREV VISIT EST AGE 40-64: CPT | Performed by: OBSTETRICS & GYNECOLOGY

## 2022-09-01 PROCEDURE — 0503F POSTPARTUM CARE VISIT: CPT | Performed by: OBSTETRICS & GYNECOLOGY

## 2022-09-01 NOTE — PROGRESS NOTES
Andreea Carranza  1980      CC:  Yearly exam    S:  38 y o  female here for yearly exam  Her cycles have become slightly irregular, but are not heavy or crampy  She does not miss cycles  Sexual activity: She is sexually active without pain, bleeding or dryness  Contraception: She uses her tubal for contraception  Last Pap 7/27/2021 - normal/negative HPV  Last Mammo - never went    We reviewed [facility] guidelines for Pap testing today         Current Outpatient Medications:     citalopram (CeleXA) 10 mg tablet, TAKE 1 TAB BY MOUTH DAILY FOR MOOD, Disp: , Rfl:     multivitamin (THERAGRAN) TABS, Take 1 tablet by mouth daily, Disp: , Rfl:   Social History     Socioeconomic History    Marital status: /Civil Union     Spouse name: Not on file    Number of children: Not on file    Years of education: Not on file    Highest education level: Not on file   Occupational History    Not on file   Tobacco Use    Smoking status: Never Smoker    Smokeless tobacco: Never Used   Vaping Use    Vaping Use: Never used   Substance and Sexual Activity    Alcohol use: Not Currently    Drug use: No    Sexual activity: Yes     Partners: Male     Birth control/protection: Female Sterilization     Comment:    Other Topics Concern    Not on file   Social History Narrative    Not on file     Social Determinants of Health     Financial Resource Strain: Not on file   Food Insecurity: Not on file   Transportation Needs: Not on file   Physical Activity: Not on file   Stress: Not on file   Social Connections: Not on file   Intimate Partner Violence: Not on file   Housing Stability: Not on file     Family History   Problem Relation Age of Onset    Asthma Brother     Diabetes Brother     Cancer Maternal Grandmother     Diabetes Paternal Grandfather     No Known Problems Mother     No Known Problems Father       Past Medical History:   Diagnosis Date    Anxiety     Enlarged thyroid     nodules found in thyroid per patient    PONV (postoperative nausea and vomiting)     Varicella     childhood        Review of Systems   Respiratory: Negative  Cardiovascular: Negative  Gastrointestinal: Negative for constipation and diarrhea  Genitourinary: Negative for difficulty urinating, pelvic pain, vaginal bleeding, vaginal discharge, itching or odor  O:  Blood pressure 130/70, height 5' 2 5" (1 588 m), weight 67 6 kg (149 lb), last menstrual period 08/28/2022, currently breastfeeding  Patient appears well and is not in distress  Neck with bilateral enlargement of thyroid; followed by [facility]  Breasts are symmetrical without mass, tenderness, nipple discharge, skin changes or adenopathy  Abdomen is soft and nontender without masses  External genitals are normal without lesions or rashes  Urethral meatus and urethra are normal  Bladder is normal to palpation  Vagina is normal without discharge or bleeding  Cervix is normal without discharge or lesion  Uterus is normal, mobile, nontender without palpable mass  Adnexa are normal, nontender, without palpable mass  A:   Yearly exam      P:   Pap due 2026   Mammo slip provided     RTO one year for yearly exam or sooner as needed

## 2022-12-09 ENCOUNTER — HOSPITAL ENCOUNTER (OUTPATIENT)
Dept: MAMMOGRAPHY | Facility: CLINIC | Age: 42
Discharge: HOME | End: 2022-12-09

## 2022-12-09 VITALS — BODY MASS INDEX: 26.58 KG/M2 | HEIGHT: 63 IN | WEIGHT: 150 LBS

## 2022-12-09 DIAGNOSIS — Z12.31 ENCOUNTER FOR SCREENING MAMMOGRAM FOR MALIGNANT NEOPLASM OF BREAST: ICD-10-CM

## 2023-01-26 ENCOUNTER — OFFICE VISIT (OUTPATIENT)
Dept: DERMATOLOGY | Facility: CLINIC | Age: 43
End: 2023-01-26

## 2023-01-26 VITALS — HEIGHT: 63 IN | WEIGHT: 151.5 LBS | BODY MASS INDEX: 26.84 KG/M2

## 2023-01-26 DIAGNOSIS — L98.9 SKIN LESION: Primary | ICD-10-CM

## 2023-01-26 NOTE — PROGRESS NOTES
[facility] Note     Patient Name: Merrick Jimenez  Encounter Date: 01/26/2026     Have you been cared for by a [place] [id number] Dermatologist in the last 3 years and, if so, which description applies to you? NO  I am considered a "new" patient and must complete all patient intake questions  I am FEMALE/of child-bearing potential     REVIEW OF SYSTEMS:  Have you recently had or currently have any of the following? · Recent fever or chills? No  · Any non-healing wound? No  · Are you pregnant or planning to become pregnant? No  · Are you currently or planning to be nursing or breast feeding? No   PAST MEDICAL HISTORY:  Have you personally ever had or currently have any of the following? If "YES," then please provide more detail  · Skin cancer (such as Melanoma, Basal Cell Carcinoma, Squamous Cell Carcinoma? No  · Tuberculosis, HIV/AIDS, Hepatitis B or C: No  · Systemic Immunosuppression such as Diabetes, Biologic or Immunotherapy, Chemotherapy, Organ Transplantation, Bone Marrow Transplantation No  · Radiation Treatment No   FAMILY HISTORY:  Any "first degree relatives" (parent, brother, sister, or child) with the following? • Skin Cancer, Pancreatic or Other Cancer? No   PATIENT EXPERIENCE:    • Do you want the Dermatologist to perform a COMPLETE skin exam today including a clinical examination under the "bra and underwear" areas? NO  • If necessary, do we have your permission to call and leave a detailed message on your Preferred Phone number that includes your specific medical information?   Yes      Allergies   Allergen Reactions   • Bee Pollen    • Clarithromycin GI Intolerance and Nausea Only     Biaxin   • Other    • Pollen Extract    • Seasonal Ic  [Cholestatin]    • Short Ragweed Pollen Ext    • Statins       Current Outpatient Medications:   •  citalopram (CeleXA) 10 mg tablet, TAKE 1 TAB BY MOUTH DAILY FOR MOOD, Disp: , Rfl:   •  multivitamin (THERAGRAN) TABS, Take 1 tablet by mouth daily, Disp: , Rfl:           • Whom besides the patient is providing clinical information about today's encounter?   o NO ADDITIONAL HISTORIAN (patient alone provided history)    Physical Exam and Assessment/Plan by Diagnosis:      1  BENIGN LESION OF SKIN    Physical Exam:  • Anatomic Location Affected:   Mostly on sun-exposed areas of the Left Thigh   • Morphological Description:    • Pertinent Positives:  • Pertinent Negatives: Additional History of Present Condition:  N/A    Assessment and Plan:  Based on a thorough discussion of this condition and the management approach to it (including a comprehensive discussion of the known risks, side effects and potential benefits of treatment), the patient (family) agrees to implement the following specific plan:  • Might be an early, evolving dermatofibroma  • Recheck in 4 months, or sooner with any change  Melanocytic Nevi  Melanocytic nevi ("moles") are caused by collections of the color producing skin cells, or melanocytes, in 1 area in the skin  They can range in color from pink to dark brown and be either raised or flat  Some moles are present at birth (I e , "congenital nevi"), while others come up later in life (i e , "acquired nevi")  Velez Beam exposure also stimulates the body to make more moles, ie the more sun you get the more moles you'll grow  Clinically distinguishing a healthy mole from melanoma may be difficult  The "ABCDE's" of moles have been suggested as a means of helping to alert a person to a suspicious mole and the possible increased risk of melanoma  Asymmetry: Healthy moles tend to be symmetric, while melanomas are often asymmetric  Asymmetry means if you draw a line through the mole, the two halves do not match in color, size, shape, or surface texture Any mole that starts to demonstrate "asymmetry" should be examined promptly by a board certified dermatologist      Border: Healthy moles tend to have discrete, even borders  The border of a melanoma often blends into the normal skin and does not sharply delineate the mole from normal skin  Any mole that starts to demonstrate "uneven borders" should be examined promptly     Color: Healthy moles tend to be one color throughout  Melanomas tend to be made up of different colors ranging from dark black, blue, white, or red  Any mole that demonstrates a color change should be examined promptly    Diameter: Healthy moles tend to be smaller than 0 6 cm in size; an exception are "congenital nevi" that can be larger  Melanomas tend to grow and can often be greater than 0 6 cm (1/4 of an inch, or the size of a pencil eraser)  This is only a guideline, and many normal moles may be larger than 0 6 cm without being unhealthy  Any mole that starts to change in size (small to bigger or bigger to smaller) should be examined promptly    Evolving: Healthy moles tend to "stay the same "  Melanomas may often show signs of change or evolution such as a change in size, shape, color, or elevation  Any mole that starts to itch, bleed, crust, burn, hurt, or ulcerate or demonstrate a change or evolution should be examined promptly by a board certified dermatologist       What are atypical moles or dysplastic nevi? Dysplastic moles are moles that have some of the ABCDE  changes listed above but  are not cancerous  Sometimes a biopsy and microscopic examination are needed to determine the difference  They may indicate an increased risk of melanoma in that person, especially if there is a family history of melanoma  What is a Melanoma? The main concern when looking at a new or changing mole it to evaluate whether it may be a melanoma  The appearance of a "new mole" remains one of the most reliable methods for identifying a malignant melanoma  A melanoma is a type of skin cancer that can be deadly if it spreads throughout the body   The prognosis of a Melanoma depends on how deep it has penetrated in the skin   If caught early, they generally will not have had time to grow into the deeper layers of the skin and they cure rate is then very high  Once the melanoma grows deeper into the skin, the cure rate drops dramatically  Therefore, early detection and removal of a malignant melanoma results in a much better chance of complete cure               Scribe Attestation    I,:  Spencer Rayo am acting as a scribe while in the presence of the attending physician :       I,:  Dinora Santizo MD personally performed the services described in this documentation    as scribed in my presence :

## 2023-01-26 NOTE — PATIENT INSTRUCTIONS
Melanocytic Nevi  Melanocytic nevi ("moles") are caused by collections of the color producing skin cells, or melanocytes, in 1 area in the skin  They can range in color from pink to dark brown and be either raised or flat  Some moles are present at birth (I e , "congenital nevi"), while others come up later in life (i e , "acquired nevi")  Seema Gallia exposure also stimulates the body to make more moles, ie the more sun you get the more moles you'll grow  Clinically distinguishing a healthy mole from melanoma may be difficult  The "ABCDE's" of moles have been suggested as a means of helping to alert a person to a suspicious mole and the possible increased risk of melanoma  Asymmetry: Healthy moles tend to be symmetric, while melanomas are often asymmetric  Asymmetry means if you draw a line through the mole, the two halves do not match in color, size, shape, or surface texture Any mole that starts to demonstrate "asymmetry" should be examined promptly by a board certified dermatologist      Border: Healthy moles tend to have discrete, even borders  The border of a melanoma often blends into the normal skin and does not sharply delineate the mole from normal skin  Any mole that starts to demonstrate "uneven borders" should be examined promptly     Color: Healthy moles tend to be one color throughout  Melanomas tend to be made up of different colors ranging from dark black, blue, white, or red  Any mole that demonstrates a color change should be examined promptly    Diameter: Healthy moles tend to be smaller than 0 6 cm in size; an exception are "congenital nevi" that can be larger  Melanomas tend to grow and can often be greater than 0 6 cm (1/4 of an inch, or the size of a pencil eraser)  This is only a guideline, and many normal moles may be larger than 0 6 cm without being unhealthy    Any mole that starts to change in size (small to bigger or bigger to smaller) should be examined promptly    Evolving: Healthy moles tend to "stay the same "  Melanomas may often show signs of change or evolution such as a change in size, shape, color, or elevation  Any mole that starts to itch, bleed, crust, burn, hurt, or ulcerate or demonstrate a change or evolution should be examined promptly by a board certified dermatologist       What are atypical moles or dysplastic nevi? Dysplastic moles are moles that have some of the ABCDE  changes listed above but  are not cancerous  Sometimes a biopsy and microscopic examination are needed to determine the difference  They may indicate an increased risk of melanoma in that person, especially if there is a family history of melanoma  What is a Melanoma? The main concern when looking at a new or changing mole it to evaluate whether it may be a melanoma  The appearance of a "new mole" remains one of the most reliable methods for identifying a malignant melanoma  A melanoma is a type of skin cancer that can be deadly if it spreads throughout the body  The prognosis of a Melanoma depends on how deep it has penetrated in the skin  If caught early, they generally will not have had time to grow into the deeper layers of the skin and they cure rate is then very high  Once the melanoma grows deeper into the skin, the cure rate drops dramatically  Therefore, early detection and removal of a malignant melanoma results in a much better chance of complete cure

## 2024-03-08 ENCOUNTER — OFFICE VISIT (OUTPATIENT)
Dept: OBGYN CLINIC | Facility: CLINIC | Age: 44
End: 2024-03-08
Payer: COMMERCIAL

## 2024-03-08 VITALS
BODY MASS INDEX: 26.58 KG/M2 | HEIGHT: 63 IN | WEIGHT: 150 LBS | SYSTOLIC BLOOD PRESSURE: 110 MMHG | DIASTOLIC BLOOD PRESSURE: 68 MMHG

## 2024-03-08 DIAGNOSIS — Z12.31 ENCOUNTER FOR SCREENING MAMMOGRAM FOR MALIGNANT NEOPLASM OF BREAST: ICD-10-CM

## 2024-03-08 DIAGNOSIS — Z01.419 ENCOUNTER FOR GYNECOLOGICAL EXAMINATION (GENERAL) (ROUTINE) WITHOUT ABNORMAL FINDINGS: Primary | ICD-10-CM

## 2024-03-08 PROCEDURE — S0612 ANNUAL GYNECOLOGICAL EXAMINA: HCPCS | Performed by: OBSTETRICS & GYNECOLOGY

## 2024-03-08 NOTE — PROGRESS NOTES
Kia Norwood  1980      CC:  Yearly exam    S:  44 y.o. female here for yearly exam. Her cycles are regular, not heavy or crampy.     She is overdue to see endocrinology at [facility] for her thyroid.     Sexual activity: She is sexually active without pain, bleeding or dryness.     Contraception: She uses her tubal for contraception.     Last Pap 7/27/2021 - normal/negative HPV  Last Mammo 12/9/2022 - BIRAD-1    We reviewed [facility] guidelines for Pap testing today.         Current Outpatient Medications:     citalopram (CeleXA) 10 mg tablet, TAKE 1 TAB BY MOUTH DAILY FOR MOOD (Patient not taking: Reported on 3/8/2024), Disp: , Rfl:     multivitamin (THERAGRAN) TABS, Take 1 tablet by mouth daily, Disp: , Rfl:   Social History     Socioeconomic History    Marital status: /Civil Union     Spouse name: Not on file    Number of children: Not on file    Years of education: Not on file    Highest education level: Not on file   Occupational History    Not on file   Tobacco Use    Smoking status: Never    Smokeless tobacco: Never   Vaping Use    Vaping status: Never Used   Substance and Sexual Activity    Alcohol use: Not Currently    Drug use: No    Sexual activity: Yes     Partners: Male     Birth control/protection: Female Sterilization     Comment:    Other Topics Concern    Not on file   Social History Narrative    Not on file     Social Determinants of Health     Financial Resource Strain: Low Risk  (4/25/2023)    Received from [facility]    Overall Financial Resource Strain (CARDIA)     Difficulty of Paying Living Expenses: Not hard at all   Food Insecurity: No Food Insecurity (4/25/2023)    Received from [facility]    Hunger Vital Sign     Worried About Running Out of Food in the Last Year: Never true     Ran Out of Food in the Last Year: Never true   Transportation Needs: No Transportation Needs (4/25/2023)    Received from [facility]    MASSIMO  - Transportation     Lack of Transportation (Medical): No     Lack of Transportation (Non-Medical): No   Physical Activity: Not on file   Stress: Stress Concern Present (4/25/2023)    Received from [facility]    [employer] [place] [employer] - Occupational Stress Questionnaire     Feeling of Stress : To some extent   Social Connections: Moderately Isolated (4/25/2023)    Received from [facility]    Social Connection and Isolation Panel [NHANES]     Frequency of Communication with Friends and Family: More than three times a week     Frequency of Social Gatherings with Friends and Family: Twice a week     Attends Anglican Services: Never     Active Member of Clubs or Organizations: No     Attends Club or Organization Meetings: Not on file     Marital Status:    Intimate Partner Violence: Not At Risk (4/25/2023)    Received from [facility]    Humiliation, Afraid, Rape, and Kick questionnaire     Fear of Current or Ex-Partner: No     Emotionally Abused: No     Physically Abused: No     Sexually Abused: No   Housing Stability: Low Risk  (4/25/2023)    Received from [facility]    Housing Stability Vital Sign     Unable to Pay for Housing in the Last Year: No     Number of Places Lived in the Last Year: 1     Unstable Housing in the Last Year: No     Family History   Problem Relation Age of Onset    No Known Problems Mother     No Known Problems Father     No Known Problems Daughter     Cancer Maternal Grandmother     Brain cancer Maternal Grandmother         60's    No Known Problems Maternal Grandfather     No Known Problems Paternal Grandmother     Diabetes Paternal Grandfather     Asthma Brother     Diabetes Brother     Cancer Maternal Uncle         Unknown origin-60's      Past Medical History:   Diagnosis Date    Anxiety     Enlarged thyroid     nodules found in thyroid per patient    PONV (postoperative nausea and vomiting)   "   Varicella     childhood        Review of Systems   Respiratory: Negative.    Cardiovascular: Negative.    Gastrointestinal: Negative for constipation and diarrhea.   Genitourinary: Negative for difficulty urinating, pelvic pain, vaginal bleeding, vaginal discharge, itching or odor.    O:  Blood pressure 110/68, height 5' 3\" (1.6 m), weight 68 kg (150 lb), last menstrual period 02/20/2024, currently breastfeeding.    Patient appears well and is not in distress  Neck is supple, +enlarged thyroid but consistent with previous exams  Breasts are symmetrical without mass, tenderness, nipple discharge, skin changes or adenopathy.   Abdomen is soft and nontender without masses.   External genitals are normal without lesions or rashes.  Urethral meatus and urethra are normal  Bladder is normal to palpation  Vagina is normal without discharge or bleeding.   Cervix is normal without discharge or lesion.   Uterus is normal, mobile, nontender without palpable mass.  Adnexa are normal, nontender, without palpable mass.     A:   Yearly exam.     P:   Pap due 2026   Mammo slip provided     RTO one year for yearly exam or sooner as needed.     "

## 2025-05-22 ENCOUNTER — ANNUAL EXAM (OUTPATIENT)
Dept: OBGYN CLINIC | Facility: CLINIC | Age: 45
End: 2025-05-22

## 2025-05-22 VITALS
HEIGHT: 63 IN | BODY MASS INDEX: 29.06 KG/M2 | SYSTOLIC BLOOD PRESSURE: 120 MMHG | DIASTOLIC BLOOD PRESSURE: 80 MMHG | WEIGHT: 164 LBS

## 2025-05-22 DIAGNOSIS — N89.8 VAGINAL ITCHING: ICD-10-CM

## 2025-05-22 DIAGNOSIS — Z01.419 ENCOUNTER FOR GYNECOLOGICAL EXAMINATION WITHOUT ABNORMAL FINDING: Primary | ICD-10-CM

## 2025-05-22 RX ORDER — FLUCONAZOLE 150 MG/1
150 TABLET ORAL ONCE
Qty: 1 TABLET | Refills: 0 | Status: SHIPPED | OUTPATIENT
Start: 2025-05-22 | End: 2025-05-22

## 2025-05-22 NOTE — PROGRESS NOTES
Annual Wellness Visit  Name: Kia Norwood      : 1980      MRN: [id number]  Encounter Provider: Betzy Stuart MD  Encounter Date: 2025   Encounter department: [facility] [place]    45 y.o.  yo presents today for her annual exam.:  Assessment & Plan  Encounter for gynecological examination without abnormal finding  Pap due   Mammogram scheduled  Return for annual or PRN       Vaginal itching    Orders:    fluconazole (DIFLUCAN) 150 mg tablet; Take 1 tablet (150 mg total) by mouth once for 1 dose  Patient to monitor and take diflucan if itching does not resolve.             History of Present Illness     Kia Norwood is a 45 y.o. female who presents for annual well woman exam.    GYN:  Reports itching since recent trip to beach and [place]; denies vaginal discharge, labial erythema or lesions, dyspareunia.  Menses are regular, normal flow and duration.  Contraception: tubal.  Patient is sexually active with male partner.  Hx gynecologic surgeries: laparoscopic salpingectomy    OB:   female    :  denies dysuria, urinary frequency or urgency.  denies hematuria, flank pain, incontinence.  denies constipation, diarrhea    Breast:  denies breast mass, skin changes, dimpling, reddening, nipple retraction.  denies breast discharge.  Patient does not have a family history of breast, endometrial, or ovarian ca.     General:  ETOH use: denies  Tobacco use: denies  Recreational drug use: denies    Screening:  Cervical cancer: last pap smear in 2021 - normal/negative  Breast cancer: last mammogram in 2022. Results were BIRAD-1.  Colon cancer: never  Depression screening: negative    Review of Systems as per HPI  Medical History Reviewed by provider this encounter:  Tobacco  Allergies  Meds  Problems  Med Hx  Surg Hx  Fam Hx     .     Objective   /80 (BP Location: Left arm, Patient Position: Sitting, Cuff Size:  "Standard)   Ht 5' 3\" (1.6 m)   Wt 74.4 kg (164 lb)   LMP 04/22/2025 (Approximate)   Breastfeeding No   BMI 29.05 kg/m²      Physical Exam  Constitutional:       Appearance: Normal appearance.   Neck:      Thyroid: Thyromegaly present.      Comments: Known enlarged thyroid; following with PCPChest:   Breasts:     Right: Normal. No swelling, bleeding, inverted nipple, mass, nipple discharge, skin change or tenderness.      Left: Normal. No swelling, bleeding, inverted nipple, mass, nipple discharge, skin change or tenderness.   Genitourinary:     Labia:         Right: No rash, tenderness or lesion.         Left: No rash, tenderness or lesion.       Urethra: No prolapse, urethral pain, urethral swelling or urethral lesion.      Vagina: Normal.      Cervix: Normal.      Uterus: Normal.       Adnexa: Right adnexa normal and left adnexa normal.        Right: No mass or tenderness.          Left: No mass or tenderness.       Musculoskeletal:      Cervical back: Neck supple.   Lymphadenopathy:      Upper Body:      Right upper body: No axillary adenopathy.      Left upper body: No axillary adenopathy.     Neurological:      Mental Status: She is alert.             "

## (undated) DEVICE — ADHESIVE SKN CLSR HISTOACRYL FLEX 0.5ML LF

## (undated) DEVICE — GLOVE INDICATOR PI UNDERGLOVE SZ 7 BLUE

## (undated) DEVICE — GLOVE PI ULTRA TOUCH SZ.7.0

## (undated) DEVICE — ENDOPATH XCEL BLADELESS TROCARS WITH STABILITY SLEEVES: Brand: ENDOPATH XCEL

## (undated) DEVICE — ENDOPATH XCEL UNIVERSAL TROCAR STABLILITY SLEEVES: Brand: ENDOPATH XCEL

## (undated) DEVICE — ENSEAL LAPAROSCOPIC TISSUE SEALER G2 CURVED JAW FOR USE WITH G2 GENERATOR 5MM DIAMETER 35CM SHAFT LENGTH: Brand: ENSEAL

## (undated) DEVICE — INTENDED FOR TISSUE SEPARATION, AND OTHER PROCEDURES THAT REQUIRE A SHARP SURGICAL BLADE TO PUNCTURE OR CUT.: Brand: BARD-PARKER SAFETY BLADES SIZE 11, STERILE

## (undated) DEVICE — ENDOPATH XCEL BLUNT TIP TROCARS WITH SMOOTH SLEEVES: Brand: ENDOPATH XCEL

## (undated) DEVICE — INTENDED FOR TISSUE SEPARATION, AND OTHER PROCEDURES THAT REQUIRE A SHARP SURGICAL BLADE TO PUNCTURE OR CUT.: Brand: BARD-PARKER SAFETY BLADES SIZE 15, STERILE

## (undated) DEVICE — IRRIG ENDO FLO TUBING

## (undated) DEVICE — PLUMEPEN PRO 10FT

## (undated) DEVICE — NEEDLE 22 G X 1 1/2 SAFETY

## (undated) DEVICE — UTERINE MANIPULATOR 4.5MM STRL

## (undated) DEVICE — 3000CC GUARDIAN II: Brand: GUARDIAN

## (undated) DEVICE — SUT VICRYL 0 UR-6 27 IN J603H

## (undated) DEVICE — CHLORAPREP HI-LITE 26ML ORANGE

## (undated) DEVICE — ANTI-FOG SOLUTION WITH FOAM PAD: Brand: DEVON

## (undated) DEVICE — GLOVE SRG BIOGEL ORTHOPEDIC 8

## (undated) DEVICE — SUT VICRYL 3-0 SH 27 IN J416H

## (undated) DEVICE — REM POLYHESIVE ADULT PATIENT RETURN ELECTRODE: Brand: VALLEYLAB

## (undated) DEVICE — STERILE MINOR LAPAROSCOPY PACK: Brand: CARDINAL HEALTH

## (undated) DEVICE — SUT MONOCRYL 4-0 PS-2 18 IN Y496G

## (undated) DEVICE — INSUFLATION TUBING INSUFLOW (LEXION)